# Patient Record
Sex: FEMALE | Race: WHITE | NOT HISPANIC OR LATINO
[De-identification: names, ages, dates, MRNs, and addresses within clinical notes are randomized per-mention and may not be internally consistent; named-entity substitution may affect disease eponyms.]

---

## 2017-04-10 ENCOUNTER — APPOINTMENT (OUTPATIENT)
Dept: UROLOGY | Facility: CLINIC | Age: 67
End: 2017-04-10

## 2017-05-22 ENCOUNTER — APPOINTMENT (OUTPATIENT)
Dept: UROLOGY | Facility: CLINIC | Age: 67
End: 2017-05-22

## 2017-05-22 ENCOUNTER — OUTPATIENT (OUTPATIENT)
Dept: OUTPATIENT SERVICES | Facility: HOSPITAL | Age: 67
LOS: 1 days | End: 2017-05-22
Payer: MEDICARE

## 2017-05-22 VITALS
BODY MASS INDEX: 30.53 KG/M2 | DIASTOLIC BLOOD PRESSURE: 83 MMHG | HEIGHT: 66 IN | HEART RATE: 72 BPM | WEIGHT: 190 LBS | SYSTOLIC BLOOD PRESSURE: 162 MMHG | TEMPERATURE: 98.3 F | RESPIRATION RATE: 17 BRPM

## 2017-05-22 DIAGNOSIS — N20.0 CALCULUS OF KIDNEY: ICD-10-CM

## 2017-05-22 DIAGNOSIS — Z90.5 ACQUIRED ABSENCE OF KIDNEY: Chronic | ICD-10-CM

## 2017-05-22 DIAGNOSIS — N13.30 UNSPECIFIED HYDRONEPHROSIS: ICD-10-CM

## 2017-05-22 DIAGNOSIS — C76.51 MALIGNANT NEOPLASM OF RIGHT LOWER LIMB: Chronic | ICD-10-CM

## 2017-05-22 DIAGNOSIS — Z98.89 OTHER SPECIFIED POSTPROCEDURAL STATES: Chronic | ICD-10-CM

## 2017-05-22 DIAGNOSIS — Z90.49 ACQUIRED ABSENCE OF OTHER SPECIFIED PARTS OF DIGESTIVE TRACT: Chronic | ICD-10-CM

## 2017-05-22 DIAGNOSIS — R82.99 OTHER ABNORMAL FINDINGS IN URINE: ICD-10-CM

## 2017-05-22 DIAGNOSIS — R35.0 FREQUENCY OF MICTURITION: ICD-10-CM

## 2017-05-22 PROCEDURE — 76775 US EXAM ABDO BACK WALL LIM: CPT

## 2017-07-03 ENCOUNTER — APPOINTMENT (OUTPATIENT)
Dept: UROLOGY | Facility: CLINIC | Age: 67
End: 2017-07-03

## 2018-01-21 ENCOUNTER — TRANSCRIPTION ENCOUNTER (OUTPATIENT)
Age: 68
End: 2018-01-21

## 2018-04-10 ENCOUNTER — RESULT REVIEW (OUTPATIENT)
Age: 68
End: 2018-04-10

## 2018-04-10 ENCOUNTER — OUTPATIENT (OUTPATIENT)
Dept: OUTPATIENT SERVICES | Facility: HOSPITAL | Age: 68
LOS: 1 days | End: 2018-04-10
Payer: MEDICARE

## 2018-04-10 DIAGNOSIS — Z90.5 ACQUIRED ABSENCE OF KIDNEY: Chronic | ICD-10-CM

## 2018-04-10 DIAGNOSIS — C78.7 SECONDARY MALIGNANT NEOPLASM OF LIVER AND INTRAHEPATIC BILE DUCT: ICD-10-CM

## 2018-04-10 DIAGNOSIS — Z98.89 OTHER SPECIFIED POSTPROCEDURAL STATES: Chronic | ICD-10-CM

## 2018-04-10 DIAGNOSIS — C76.51 MALIGNANT NEOPLASM OF RIGHT LOWER LIMB: Chronic | ICD-10-CM

## 2018-04-10 DIAGNOSIS — Z90.49 ACQUIRED ABSENCE OF OTHER SPECIFIED PARTS OF DIGESTIVE TRACT: Chronic | ICD-10-CM

## 2018-04-10 PROCEDURE — 47000 NEEDLE BIOPSY OF LIVER PERQ: CPT

## 2018-04-10 PROCEDURE — 88307 TISSUE EXAM BY PATHOLOGIST: CPT

## 2018-04-10 PROCEDURE — 77012 CT SCAN FOR NEEDLE BIOPSY: CPT

## 2018-04-10 PROCEDURE — 77012 CT SCAN FOR NEEDLE BIOPSY: CPT | Mod: 26

## 2018-04-10 PROCEDURE — 88307 TISSUE EXAM BY PATHOLOGIST: CPT | Mod: 26

## 2018-04-11 LAB — SURGICAL PATHOLOGY FINAL REPORT - CH: SIGNIFICANT CHANGE UP

## 2018-05-21 ENCOUNTER — APPOINTMENT (OUTPATIENT)
Dept: UROLOGY | Facility: CLINIC | Age: 68
End: 2018-05-21

## 2018-06-15 ENCOUNTER — OUTPATIENT (OUTPATIENT)
Dept: OUTPATIENT SERVICES | Facility: HOSPITAL | Age: 68
LOS: 1 days | End: 2018-06-15
Payer: MEDICARE

## 2018-06-15 DIAGNOSIS — Z90.49 ACQUIRED ABSENCE OF OTHER SPECIFIED PARTS OF DIGESTIVE TRACT: Chronic | ICD-10-CM

## 2018-06-15 DIAGNOSIS — Z98.89 OTHER SPECIFIED POSTPROCEDURAL STATES: Chronic | ICD-10-CM

## 2018-06-15 DIAGNOSIS — C76.51 MALIGNANT NEOPLASM OF RIGHT LOWER LIMB: Chronic | ICD-10-CM

## 2018-06-15 DIAGNOSIS — Z90.5 ACQUIRED ABSENCE OF KIDNEY: Chronic | ICD-10-CM

## 2018-06-15 DIAGNOSIS — C49.8 MALIGNANT NEOPLASM OF OVERLAPPING SITES OF CONNECTIVE AND SOFT TISSUE: ICD-10-CM

## 2018-06-15 PROCEDURE — 96375 TX/PRO/DX INJ NEW DRUG ADDON: CPT

## 2018-06-15 PROCEDURE — 96415 CHEMO IV INFUSION ADDL HR: CPT

## 2018-06-15 PROCEDURE — 96413 CHEMO IV INFUSION 1 HR: CPT

## 2018-06-15 RX ORDER — DIPHENHYDRAMINE HCL 50 MG
50 CAPSULE ORAL ONCE
Qty: 0 | Refills: 0 | Status: COMPLETED | OUTPATIENT
Start: 2018-06-15 | End: 2018-06-15

## 2018-06-15 RX ORDER — ACETAMINOPHEN 500 MG
650 TABLET ORAL ONCE
Qty: 0 | Refills: 0 | Status: COMPLETED | OUTPATIENT
Start: 2018-06-15 | End: 2018-06-15

## 2018-06-15 RX ORDER — BEVACIZUMAB 400 MG/16ML
480 INJECTION, SOLUTION INTRAVENOUS ONCE
Qty: 0 | Refills: 0 | Status: DISCONTINUED | OUTPATIENT
Start: 2018-06-15 | End: 2018-06-30

## 2018-06-15 RX ADMIN — Medication 650 MILLIGRAM(S): at 11:21

## 2018-06-15 RX ADMIN — Medication 50 MILLIGRAM(S): at 11:21

## 2018-06-28 ENCOUNTER — OUTPATIENT (OUTPATIENT)
Dept: OUTPATIENT SERVICES | Facility: HOSPITAL | Age: 68
LOS: 1 days | End: 2018-06-28
Payer: MEDICARE

## 2018-06-28 DIAGNOSIS — Z90.5 ACQUIRED ABSENCE OF KIDNEY: Chronic | ICD-10-CM

## 2018-06-28 DIAGNOSIS — C49.8 MALIGNANT NEOPLASM OF OVERLAPPING SITES OF CONNECTIVE AND SOFT TISSUE: ICD-10-CM

## 2018-06-28 DIAGNOSIS — C76.51 MALIGNANT NEOPLASM OF RIGHT LOWER LIMB: Chronic | ICD-10-CM

## 2018-06-28 DIAGNOSIS — Z90.49 ACQUIRED ABSENCE OF OTHER SPECIFIED PARTS OF DIGESTIVE TRACT: Chronic | ICD-10-CM

## 2018-06-28 DIAGNOSIS — Z98.89 OTHER SPECIFIED POSTPROCEDURAL STATES: Chronic | ICD-10-CM

## 2018-06-28 PROCEDURE — 96413 CHEMO IV INFUSION 1 HR: CPT

## 2018-06-28 PROCEDURE — 96375 TX/PRO/DX INJ NEW DRUG ADDON: CPT

## 2018-06-28 RX ORDER — BEVACIZUMAB 400 MG/16ML
480 INJECTION, SOLUTION INTRAVENOUS ONCE
Qty: 0 | Refills: 0 | Status: DISCONTINUED | OUTPATIENT
Start: 2018-06-28 | End: 2018-07-13

## 2018-06-28 RX ORDER — DIPHENHYDRAMINE HCL 50 MG
50 CAPSULE ORAL ONCE
Qty: 0 | Refills: 0 | Status: COMPLETED | OUTPATIENT
Start: 2018-06-28 | End: 2018-06-28

## 2018-06-28 RX ORDER — ACETAMINOPHEN 500 MG
650 TABLET ORAL ONCE
Qty: 0 | Refills: 0 | Status: COMPLETED | OUTPATIENT
Start: 2018-06-28 | End: 2018-06-28

## 2018-06-28 RX ADMIN — Medication 650 MILLIGRAM(S): at 09:25

## 2018-06-28 RX ADMIN — Medication 50 MILLIGRAM(S): at 09:25

## 2018-07-12 ENCOUNTER — OUTPATIENT (OUTPATIENT)
Dept: OUTPATIENT SERVICES | Facility: HOSPITAL | Age: 68
LOS: 1 days | End: 2018-07-12
Payer: MEDICARE

## 2018-07-12 DIAGNOSIS — Z90.49 ACQUIRED ABSENCE OF OTHER SPECIFIED PARTS OF DIGESTIVE TRACT: Chronic | ICD-10-CM

## 2018-07-12 DIAGNOSIS — C49.8 MALIGNANT NEOPLASM OF OVERLAPPING SITES OF CONNECTIVE AND SOFT TISSUE: ICD-10-CM

## 2018-07-12 DIAGNOSIS — C76.51 MALIGNANT NEOPLASM OF RIGHT LOWER LIMB: Chronic | ICD-10-CM

## 2018-07-12 DIAGNOSIS — Z90.5 ACQUIRED ABSENCE OF KIDNEY: Chronic | ICD-10-CM

## 2018-07-12 DIAGNOSIS — Z98.89 OTHER SPECIFIED POSTPROCEDURAL STATES: Chronic | ICD-10-CM

## 2018-07-12 PROCEDURE — 96375 TX/PRO/DX INJ NEW DRUG ADDON: CPT

## 2018-07-12 PROCEDURE — 96413 CHEMO IV INFUSION 1 HR: CPT

## 2018-07-12 RX ORDER — BEVACIZUMAB 400 MG/16ML
480 INJECTION, SOLUTION INTRAVENOUS ONCE
Qty: 0 | Refills: 0 | Status: DISCONTINUED | OUTPATIENT
Start: 2018-07-12 | End: 2018-07-27

## 2018-07-12 RX ORDER — DIPHENHYDRAMINE HCL 50 MG
50 CAPSULE ORAL ONCE
Qty: 0 | Refills: 0 | Status: COMPLETED | OUTPATIENT
Start: 2018-07-12 | End: 2018-07-12

## 2018-07-12 RX ORDER — ACETAMINOPHEN 500 MG
650 TABLET ORAL ONCE
Qty: 0 | Refills: 0 | Status: COMPLETED | OUTPATIENT
Start: 2018-07-12 | End: 2018-07-12

## 2018-07-12 RX ADMIN — Medication 50 MILLIGRAM(S): at 08:18

## 2018-07-12 RX ADMIN — Medication 650 MILLIGRAM(S): at 08:17

## 2018-07-26 ENCOUNTER — OUTPATIENT (OUTPATIENT)
Dept: OUTPATIENT SERVICES | Facility: HOSPITAL | Age: 68
LOS: 1 days | End: 2018-07-26
Payer: MEDICARE

## 2018-07-26 DIAGNOSIS — C76.51 MALIGNANT NEOPLASM OF RIGHT LOWER LIMB: Chronic | ICD-10-CM

## 2018-07-26 DIAGNOSIS — Z90.5 ACQUIRED ABSENCE OF KIDNEY: Chronic | ICD-10-CM

## 2018-07-26 DIAGNOSIS — C49.8 MALIGNANT NEOPLASM OF OVERLAPPING SITES OF CONNECTIVE AND SOFT TISSUE: ICD-10-CM

## 2018-07-26 DIAGNOSIS — Z90.49 ACQUIRED ABSENCE OF OTHER SPECIFIED PARTS OF DIGESTIVE TRACT: Chronic | ICD-10-CM

## 2018-07-26 DIAGNOSIS — Z98.89 OTHER SPECIFIED POSTPROCEDURAL STATES: Chronic | ICD-10-CM

## 2018-07-26 PROCEDURE — 96375 TX/PRO/DX INJ NEW DRUG ADDON: CPT

## 2018-07-26 PROCEDURE — 96413 CHEMO IV INFUSION 1 HR: CPT

## 2018-07-26 RX ORDER — DIPHENHYDRAMINE HCL 50 MG
50 CAPSULE ORAL ONCE
Qty: 0 | Refills: 0 | Status: COMPLETED | OUTPATIENT
Start: 2018-07-26 | End: 2018-07-26

## 2018-07-26 RX ORDER — BEVACIZUMAB 400 MG/16ML
480 INJECTION, SOLUTION INTRAVENOUS ONCE
Qty: 0 | Refills: 0 | Status: DISCONTINUED | OUTPATIENT
Start: 2018-07-26 | End: 2018-08-10

## 2018-07-26 RX ORDER — ACETAMINOPHEN 500 MG
650 TABLET ORAL ONCE
Qty: 0 | Refills: 0 | Status: COMPLETED | OUTPATIENT
Start: 2018-07-26 | End: 2018-07-26

## 2018-07-26 RX ADMIN — Medication 50 MILLIGRAM(S): at 11:48

## 2018-07-26 RX ADMIN — Medication 650 MILLIGRAM(S): at 11:49

## 2018-08-08 ENCOUNTER — APPOINTMENT (OUTPATIENT)
Dept: UROLOGY | Facility: CLINIC | Age: 68
End: 2018-08-08
Payer: MEDICARE

## 2018-08-08 VITALS
RESPIRATION RATE: 17 BRPM | TEMPERATURE: 98 F | SYSTOLIC BLOOD PRESSURE: 136 MMHG | HEART RATE: 65 BPM | WEIGHT: 202 LBS | DIASTOLIC BLOOD PRESSURE: 82 MMHG | HEIGHT: 66 IN | BODY MASS INDEX: 32.47 KG/M2

## 2018-08-08 DIAGNOSIS — R82.99 OTHER ABNORMAL FINDINGS IN URINE: ICD-10-CM

## 2018-08-08 PROCEDURE — 99214 OFFICE O/P EST MOD 30 MIN: CPT

## 2018-08-09 ENCOUNTER — OUTPATIENT (OUTPATIENT)
Dept: OUTPATIENT SERVICES | Facility: HOSPITAL | Age: 68
LOS: 1 days | End: 2018-08-09
Payer: MEDICARE

## 2018-08-09 DIAGNOSIS — Z90.5 ACQUIRED ABSENCE OF KIDNEY: Chronic | ICD-10-CM

## 2018-08-09 DIAGNOSIS — Z98.89 OTHER SPECIFIED POSTPROCEDURAL STATES: Chronic | ICD-10-CM

## 2018-08-09 DIAGNOSIS — Z90.49 ACQUIRED ABSENCE OF OTHER SPECIFIED PARTS OF DIGESTIVE TRACT: Chronic | ICD-10-CM

## 2018-08-09 DIAGNOSIS — C49.8 MALIGNANT NEOPLASM OF OVERLAPPING SITES OF CONNECTIVE AND SOFT TISSUE: ICD-10-CM

## 2018-08-09 DIAGNOSIS — C76.51 MALIGNANT NEOPLASM OF RIGHT LOWER LIMB: Chronic | ICD-10-CM

## 2018-08-09 PROCEDURE — 96375 TX/PRO/DX INJ NEW DRUG ADDON: CPT

## 2018-08-09 PROCEDURE — 96413 CHEMO IV INFUSION 1 HR: CPT

## 2018-08-09 RX ORDER — BEVACIZUMAB 400 MG/16ML
480 INJECTION, SOLUTION INTRAVENOUS ONCE
Qty: 0 | Refills: 0 | Status: DISCONTINUED | OUTPATIENT
Start: 2018-08-09 | End: 2018-08-24

## 2018-08-09 RX ORDER — DIPHENHYDRAMINE HCL 50 MG
50 CAPSULE ORAL ONCE
Qty: 0 | Refills: 0 | Status: COMPLETED | OUTPATIENT
Start: 2018-08-09 | End: 2018-08-09

## 2018-08-09 RX ORDER — ACETAMINOPHEN 500 MG
650 TABLET ORAL ONCE
Qty: 0 | Refills: 0 | Status: COMPLETED | OUTPATIENT
Start: 2018-08-09 | End: 2018-08-09

## 2018-08-09 RX ADMIN — Medication 650 MILLIGRAM(S): at 08:57

## 2018-08-09 RX ADMIN — Medication 50 MILLIGRAM(S): at 08:57

## 2018-08-23 ENCOUNTER — OUTPATIENT (OUTPATIENT)
Dept: OUTPATIENT SERVICES | Facility: HOSPITAL | Age: 68
LOS: 1 days | End: 2018-08-23
Payer: MEDICARE

## 2018-08-23 DIAGNOSIS — Z98.89 OTHER SPECIFIED POSTPROCEDURAL STATES: Chronic | ICD-10-CM

## 2018-08-23 DIAGNOSIS — D64.9 ANEMIA, UNSPECIFIED: ICD-10-CM

## 2018-08-23 DIAGNOSIS — C76.51 MALIGNANT NEOPLASM OF RIGHT LOWER LIMB: Chronic | ICD-10-CM

## 2018-08-23 DIAGNOSIS — Z90.5 ACQUIRED ABSENCE OF KIDNEY: Chronic | ICD-10-CM

## 2018-08-23 DIAGNOSIS — Z90.49 ACQUIRED ABSENCE OF OTHER SPECIFIED PARTS OF DIGESTIVE TRACT: Chronic | ICD-10-CM

## 2018-08-23 PROCEDURE — 96375 TX/PRO/DX INJ NEW DRUG ADDON: CPT

## 2018-08-23 PROCEDURE — 96413 CHEMO IV INFUSION 1 HR: CPT

## 2018-08-23 RX ORDER — BEVACIZUMAB 400 MG/16ML
480 INJECTION, SOLUTION INTRAVENOUS ONCE
Qty: 0 | Refills: 0 | Status: DISCONTINUED | OUTPATIENT
Start: 2018-08-23 | End: 2018-09-07

## 2018-08-23 RX ORDER — DIPHENHYDRAMINE HCL 50 MG
50 CAPSULE ORAL ONCE
Qty: 0 | Refills: 0 | Status: COMPLETED | OUTPATIENT
Start: 2018-08-23 | End: 2018-08-23

## 2018-08-23 RX ORDER — ACETAMINOPHEN 500 MG
650 TABLET ORAL ONCE
Qty: 0 | Refills: 0 | Status: COMPLETED | OUTPATIENT
Start: 2018-08-23 | End: 2018-08-23

## 2018-08-23 RX ADMIN — Medication 650 MILLIGRAM(S): at 09:49

## 2018-08-23 RX ADMIN — Medication 50 MILLIGRAM(S): at 09:49

## 2019-02-13 ENCOUNTER — APPOINTMENT (OUTPATIENT)
Dept: UROLOGY | Facility: CLINIC | Age: 69
End: 2019-02-13
Payer: MEDICARE

## 2019-02-13 DIAGNOSIS — N20.0 CALCULUS OF KIDNEY: ICD-10-CM

## 2019-02-13 PROCEDURE — 99213 OFFICE O/P EST LOW 20 MIN: CPT

## 2019-02-13 NOTE — PHYSICAL EXAM
[General Appearance - Well Developed] : well developed [General Appearance - Well Nourished] : well nourished [Normal Appearance] : normal appearance [Well Groomed] : well groomed [General Appearance - In No Acute Distress] : no acute distress [Abdomen Soft] : soft [Abdomen Tenderness] : non-tender [Abdomen Mass (___ Cm)] : no abdominal mass palpated [Abdomen Hernia] : no hernia was discovered [Costovertebral Angle Tenderness] : no ~M costovertebral angle tenderness [Skin Color & Pigmentation] : normal skin color and pigmentation [Edema] : no peripheral edema [] : no respiratory distress [Respiration, Rhythm And Depth] : normal respiratory rhythm and effort [Exaggerated Use Of Accessory Muscles For Inspiration] : no accessory muscle use [Oriented To Time, Place, And Person] : oriented to person, place, and time [Affect] : the affect was normal [Mood] : the mood was normal [Not Anxious] : not anxious [Normal Station and Gait] : the gait and station were normal for the patient's age

## 2019-02-14 NOTE — HISTORY OF PRESENT ILLNESS
[None] : None [FreeTextEntry1] : 67y/o woman\par Hx of LEFT PCNL and URS Jan 2016. 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 \par She failed Clinical Trial treatment at [facility]. She is currently on Sutent.\par She feels well otherwise except for back pain which she attributes to the Sutent. she is now taking it every other day and experiences relief when she is not taking it. \par \par 24 hr urine: not ordered\par CT scan (Jan 2019 [facility]): no renal stones and no hydronephrosis.\par

## 2019-02-14 NOTE — ASSESSMENT
[FreeTextEntry1] : Continue increase fluids intake, lower salt intake, lower animal protein intake, increase citrus fruits and juices.\par f/u 6 months.\par We will review any imaging she has at that time which should be sufficient as long as abdomen is imaged.

## 2019-05-15 ENCOUNTER — INPATIENT (INPATIENT)
Facility: HOSPITAL | Age: 69
LOS: 1 days | Discharge: HOME | DRG: 683 | End: 2019-05-17
Attending: FAMILY MEDICINE | Admitting: FAMILY MEDICINE
Payer: MEDICARE

## 2019-05-15 VITALS
SYSTOLIC BLOOD PRESSURE: 121 MMHG | OXYGEN SATURATION: 100 % | TEMPERATURE: 99 F | HEART RATE: 107 BPM | RESPIRATION RATE: 18 BRPM | WEIGHT: 169.98 LBS | DIASTOLIC BLOOD PRESSURE: 72 MMHG

## 2019-05-15 DIAGNOSIS — Z90.49 ACQUIRED ABSENCE OF OTHER SPECIFIED PARTS OF DIGESTIVE TRACT: Chronic | ICD-10-CM

## 2019-05-15 DIAGNOSIS — Z90.5 ACQUIRED ABSENCE OF KIDNEY: Chronic | ICD-10-CM

## 2019-05-15 DIAGNOSIS — Z98.89 OTHER SPECIFIED POSTPROCEDURAL STATES: Chronic | ICD-10-CM

## 2019-05-15 DIAGNOSIS — C76.51 MALIGNANT NEOPLASM OF RIGHT LOWER LIMB: Chronic | ICD-10-CM

## 2019-05-15 DIAGNOSIS — N17.9 ACUTE KIDNEY FAILURE, UNSPECIFIED: ICD-10-CM

## 2019-05-15 LAB
ALBUMIN SERPL ELPH-MCNC: 3.3 G/DL — SIGNIFICANT CHANGE UP (ref 3.3–5)
ALP SERPL-CCNC: 207 U/L — HIGH (ref 40–120)
ALT FLD-CCNC: 48 U/L — SIGNIFICANT CHANGE UP (ref 12–78)
ANION GAP SERPL CALC-SCNC: 12 MMOL/L — SIGNIFICANT CHANGE UP (ref 5–17)
APPEARANCE UR: CLEAR — SIGNIFICANT CHANGE UP
AST SERPL-CCNC: 59 U/L — HIGH (ref 15–37)
BASOPHILS # BLD AUTO: 0.01 K/UL — SIGNIFICANT CHANGE UP (ref 0–0.2)
BASOPHILS NFR BLD AUTO: 0.3 % — SIGNIFICANT CHANGE UP (ref 0–2)
BILIRUB SERPL-MCNC: 0.8 MG/DL — SIGNIFICANT CHANGE UP (ref 0.2–1.2)
BILIRUB UR-MCNC: NEGATIVE — SIGNIFICANT CHANGE UP
BUN SERPL-MCNC: 39 MG/DL — HIGH (ref 7–23)
CALCIUM SERPL-MCNC: 8.8 MG/DL — SIGNIFICANT CHANGE UP (ref 8.5–10.1)
CHLORIDE SERPL-SCNC: 106 MMOL/L — SIGNIFICANT CHANGE UP (ref 96–108)
CO2 SERPL-SCNC: 23 MMOL/L — SIGNIFICANT CHANGE UP (ref 22–31)
COLOR SPEC: YELLOW — SIGNIFICANT CHANGE UP
CREAT SERPL-MCNC: 2.3 MG/DL — HIGH (ref 0.5–1.3)
DIFF PNL FLD: ABNORMAL
EOSINOPHIL # BLD AUTO: 0.02 K/UL — SIGNIFICANT CHANGE UP (ref 0–0.5)
EOSINOPHIL NFR BLD AUTO: 0.6 % — SIGNIFICANT CHANGE UP (ref 0–6)
GLUCOSE SERPL-MCNC: 99 MG/DL — SIGNIFICANT CHANGE UP (ref 70–99)
GLUCOSE UR QL: NEGATIVE — SIGNIFICANT CHANGE UP
HCT VFR BLD CALC: 25.6 % — LOW (ref 34.5–45)
HGB BLD-MCNC: 8.5 G/DL — LOW (ref 11.5–15.5)
IMM GRANULOCYTES NFR BLD AUTO: 0.3 % — SIGNIFICANT CHANGE UP (ref 0–1.5)
KETONES UR-MCNC: ABNORMAL
LEUKOCYTE ESTERASE UR-ACNC: ABNORMAL
LIDOCAIN IGE QN: 93 U/L — SIGNIFICANT CHANGE UP (ref 73–393)
LYMPHOCYTES # BLD AUTO: 0.59 K/UL — LOW (ref 1–3.3)
LYMPHOCYTES # BLD AUTO: 17.8 % — SIGNIFICANT CHANGE UP (ref 13–44)
MCHC RBC-ENTMCNC: 33.2 GM/DL — SIGNIFICANT CHANGE UP (ref 32–36)
MCHC RBC-ENTMCNC: 38.1 PG — HIGH (ref 27–34)
MCV RBC AUTO: 114.8 FL — HIGH (ref 80–100)
MONOCYTES # BLD AUTO: 0.48 K/UL — SIGNIFICANT CHANGE UP (ref 0–0.9)
MONOCYTES NFR BLD AUTO: 14.5 % — HIGH (ref 2–14)
NEUTROPHILS # BLD AUTO: 2.2 K/UL — SIGNIFICANT CHANGE UP (ref 1.8–7.4)
NEUTROPHILS NFR BLD AUTO: 66.5 % — SIGNIFICANT CHANGE UP (ref 43–77)
NITRITE UR-MCNC: NEGATIVE — SIGNIFICANT CHANGE UP
NRBC # BLD: 0 /100 WBCS — SIGNIFICANT CHANGE UP (ref 0–0)
NT-PROBNP SERPL-SCNC: 1462 PG/ML — HIGH (ref 0–125)
PH UR: 5 — SIGNIFICANT CHANGE UP (ref 5–8)
PLATELET # BLD AUTO: 188 K/UL — SIGNIFICANT CHANGE UP (ref 150–400)
POTASSIUM SERPL-MCNC: 3.6 MMOL/L — SIGNIFICANT CHANGE UP (ref 3.5–5.3)
POTASSIUM SERPL-SCNC: 3.6 MMOL/L — SIGNIFICANT CHANGE UP (ref 3.5–5.3)
PROT SERPL-MCNC: 6.5 G/DL — SIGNIFICANT CHANGE UP (ref 6–8.3)
PROT UR-MCNC: 75 MG/DL
RBC # BLD: 2.23 M/UL — LOW (ref 3.8–5.2)
RBC # FLD: 14.9 % — HIGH (ref 10.3–14.5)
SODIUM SERPL-SCNC: 141 MMOL/L — SIGNIFICANT CHANGE UP (ref 135–145)
SP GR SPEC: 1.02 — SIGNIFICANT CHANGE UP (ref 1.01–1.02)
TROPONIN I SERPL-MCNC: <.015 NG/ML — SIGNIFICANT CHANGE UP (ref 0.01–0.04)
UROBILINOGEN FLD QL: NEGATIVE — SIGNIFICANT CHANGE UP
WBC # BLD: 3.31 K/UL — LOW (ref 3.8–10.5)
WBC # FLD AUTO: 3.31 K/UL — LOW (ref 3.8–10.5)

## 2019-05-15 PROCEDURE — 93970 EXTREMITY STUDY: CPT | Mod: 26

## 2019-05-15 PROCEDURE — 99285 EMERGENCY DEPT VISIT HI MDM: CPT

## 2019-05-15 PROCEDURE — 71046 X-RAY EXAM CHEST 2 VIEWS: CPT | Mod: 26

## 2019-05-15 PROCEDURE — 74019 RADEX ABDOMEN 2 VIEWS: CPT | Mod: 26

## 2019-05-15 PROCEDURE — 93010 ELECTROCARDIOGRAM REPORT: CPT

## 2019-05-15 RX ORDER — SODIUM CHLORIDE 9 MG/ML
1000 INJECTION INTRAMUSCULAR; INTRAVENOUS; SUBCUTANEOUS ONCE
Refills: 0 | Status: COMPLETED | OUTPATIENT
Start: 2019-05-15 | End: 2019-05-15

## 2019-05-15 RX ORDER — HEPARIN SODIUM 5000 [USP'U]/ML
5000 INJECTION INTRAVENOUS; SUBCUTANEOUS EVERY 12 HOURS
Refills: 0 | Status: DISCONTINUED | OUTPATIENT
Start: 2019-05-15 | End: 2019-05-16

## 2019-05-15 RX ORDER — TEMOZOLOMIDE 140 MG/1
2 CAPSULE ORAL
Qty: 0 | Refills: 0 | DISCHARGE

## 2019-05-15 RX ORDER — ONDANSETRON 8 MG/1
4 TABLET, FILM COATED ORAL ONCE
Refills: 0 | Status: COMPLETED | OUTPATIENT
Start: 2019-05-15 | End: 2019-05-15

## 2019-05-15 RX ORDER — CEFTRIAXONE 500 MG/1
1 INJECTION, POWDER, FOR SOLUTION INTRAMUSCULAR; INTRAVENOUS EVERY 24 HOURS
Refills: 0 | Status: DISCONTINUED | OUTPATIENT
Start: 2019-05-15 | End: 2019-05-17

## 2019-05-15 RX ORDER — ONDANSETRON 8 MG/1
1 TABLET, FILM COATED ORAL
Qty: 0 | Refills: 0 | DISCHARGE

## 2019-05-15 RX ORDER — ATENOLOL 25 MG/1
50 TABLET ORAL DAILY
Refills: 0 | Status: DISCONTINUED | OUTPATIENT
Start: 2019-05-15 | End: 2019-05-16

## 2019-05-15 RX ORDER — SODIUM CHLORIDE 9 MG/ML
1000 INJECTION, SOLUTION INTRAVENOUS
Refills: 0 | Status: DISCONTINUED | OUTPATIENT
Start: 2019-05-15 | End: 2019-05-16

## 2019-05-15 RX ADMIN — SODIUM CHLORIDE 75 MILLILITER(S): 9 INJECTION, SOLUTION INTRAVENOUS at 22:09

## 2019-05-15 RX ADMIN — ONDANSETRON 4 MILLIGRAM(S): 8 TABLET, FILM COATED ORAL at 18:32

## 2019-05-15 RX ADMIN — ATENOLOL 50 MILLIGRAM(S): 25 TABLET ORAL at 21:15

## 2019-05-15 RX ADMIN — CEFTRIAXONE 100 GRAM(S): 500 INJECTION, POWDER, FOR SOLUTION INTRAMUSCULAR; INTRAVENOUS at 21:14

## 2019-05-15 RX ADMIN — SODIUM CHLORIDE 1000 MILLILITER(S): 9 INJECTION INTRAMUSCULAR; INTRAVENOUS; SUBCUTANEOUS at 19:32

## 2019-05-15 RX ADMIN — SODIUM CHLORIDE 1000 MILLILITER(S): 9 INJECTION INTRAMUSCULAR; INTRAVENOUS; SUBCUTANEOUS at 18:32

## 2019-05-15 NOTE — ED ADULT NURSE NOTE - PMH
Cancer of leg, right    Hypertension    Hypertension    Kidney stone    Renal calculi    Sarcoma  liver

## 2019-05-15 NOTE — ED PROVIDER NOTE - ATTENDING CONTRIBUTION TO CARE
67 y/o F sent in by Heme Onc for further evaluation after vomiting and weakness x 2 days.  Lab consistent with TOM. Discussed case with Dr. Goldstein who recommends admission for IV hydration.

## 2019-05-15 NOTE — ED PROVIDER NOTE - CARE PLAN
Principal Discharge DX:	TOM (acute kidney injury)  Secondary Diagnosis:	Vomiting  Secondary Diagnosis:	Dehydration

## 2019-05-15 NOTE — ED PROVIDER NOTE - CLINICAL SUMMARY MEDICAL DECISION MAKING FREE TEXT BOX
69 yo F with hx of sarcoma of liver and lung nodules co n/v x 2 days, intermittent for over a week, was admitted over a week ago at [facility] for L lower leg wound, is on cipro and amoxicillin, sent by oncologist today for IV hydration and further eval, will get labs, axr, IVF, antiemetics, re-assess

## 2019-05-15 NOTE — ED PROVIDER NOTE - PROGRESS NOTE DETAILS
C/D/W ED attending, Dr. Casiano who agreed with disposition and plan. Spoke to oncologist, Dr. Goldstein, advised to admit pt to Dr. Singh due to elevated creatinine and recurrent symptoms. Will see pt in the am and Dr. Oneal, wound care specialist. Spoke to Dr. Singh, discussed case and results, accepted admission.

## 2019-05-15 NOTE — ED ADULT NURSE NOTE - PSH
Cancer of leg, right  excision of tumor and txted with radiaton therapy  H/O partial nephrectomy  left -   History of nephrectomy    S/P appendectomy  cholecystectomy -   S/P arthroscopic surgery of left knee    S/P  Section  1968  S/P cholecystectomy  appendectomy  S/P D&C  2012  S/P dilatation and curettage  for polyps  S/P tubal ligation

## 2019-05-15 NOTE — ED ADULT NURSE NOTE - OBJECTIVE STATEMENT
67 y/o female with pmh of cancer presents to the ed c/o nausea and vomiting with associated abd pain. pt also has wound to LLE chronic that she has treated, last treated today. pt denies fever chills chest pain sob headache and urinary problems.

## 2019-05-15 NOTE — ED PROVIDER NOTE - OBJECTIVE STATEMENT
67 y/o F with hx of sarcoma of liver, lung nodules, HTN referred by oncologist, Dr. Hernández for evaluation. Pt c/o vomiting x 2 days. Pt states that she has had intermittent vomiting for a few weeks since she was admitted over a week ago at [facility] for L lower leg wound, dc home last week on cipro and amoxicillin.  States that she was able to keep down some fluids earlier. Pt states that she had mild erythema to L posterior lower leg, had US of BLE at [facility] with CT of chest on Saturday which was negative. Pt states that she has associated generalized weakness. Pt denies fever, diarrhea, abdominal pain, CP, SOB.   PMD: Dr. Frank Amico  Wound care: Dr. Oneal

## 2019-05-16 DIAGNOSIS — C76.51 MALIGNANT NEOPLASM OF RIGHT LOWER LIMB: ICD-10-CM

## 2019-05-16 DIAGNOSIS — N17.9 ACUTE KIDNEY FAILURE, UNSPECIFIED: ICD-10-CM

## 2019-05-16 DIAGNOSIS — L03.90 CELLULITIS, UNSPECIFIED: ICD-10-CM

## 2019-05-16 DIAGNOSIS — C49.9 MALIGNANT NEOPLASM OF CONNECTIVE AND SOFT TISSUE, UNSPECIFIED: ICD-10-CM

## 2019-05-16 DIAGNOSIS — N20.0 CALCULUS OF KIDNEY: ICD-10-CM

## 2019-05-16 DIAGNOSIS — R11.14 BILIOUS VOMITING: ICD-10-CM

## 2019-05-16 DIAGNOSIS — D48.1 NEOPLASM OF UNCERTAIN BEHAVIOR OF CONNECTIVE AND OTHER SOFT TISSUE: ICD-10-CM

## 2019-05-16 DIAGNOSIS — I10 ESSENTIAL (PRIMARY) HYPERTENSION: ICD-10-CM

## 2019-05-16 DIAGNOSIS — R11.10 VOMITING, UNSPECIFIED: ICD-10-CM

## 2019-05-16 DIAGNOSIS — L97.329 NON-PRESSURE CHRONIC ULCER OF LEFT ANKLE WITH UNSPECIFIED SEVERITY: ICD-10-CM

## 2019-05-16 DIAGNOSIS — E86.0 DEHYDRATION: ICD-10-CM

## 2019-05-16 DIAGNOSIS — D63.8 ANEMIA IN OTHER CHRONIC DISEASES CLASSIFIED ELSEWHERE: ICD-10-CM

## 2019-05-16 LAB
ANION GAP SERPL CALC-SCNC: 12 MMOL/L — SIGNIFICANT CHANGE UP (ref 5–17)
BUN SERPL-MCNC: 32 MG/DL — HIGH (ref 7–23)
CALCIUM SERPL-MCNC: 8.1 MG/DL — LOW (ref 8.5–10.1)
CALCIUM SERPL-MCNC: 8.8 MG/DL — SIGNIFICANT CHANGE UP (ref 8.4–10.5)
CHLORIDE SERPL-SCNC: 108 MMOL/L — SIGNIFICANT CHANGE UP (ref 96–108)
CK SERPL-CCNC: 49 U/L — SIGNIFICANT CHANGE UP (ref 26–192)
CO2 SERPL-SCNC: 22 MMOL/L — SIGNIFICANT CHANGE UP (ref 22–31)
CREAT ?TM UR-MCNC: 160 MG/DL — SIGNIFICANT CHANGE UP
CREAT SERPL-MCNC: 1.9 MG/DL — HIGH (ref 0.5–1.3)
EOSINOPHIL NFR URNS MANUAL: NEGATIVE — SIGNIFICANT CHANGE UP
GLUCOSE SERPL-MCNC: 79 MG/DL — SIGNIFICANT CHANGE UP (ref 70–99)
HCT VFR BLD CALC: 24.7 % — LOW (ref 34.5–45)
HGB BLD-MCNC: 8.1 G/DL — LOW (ref 11.5–15.5)
MCHC RBC-ENTMCNC: 32.8 GM/DL — SIGNIFICANT CHANGE UP (ref 32–36)
MCHC RBC-ENTMCNC: 37.7 PG — HIGH (ref 27–34)
MCV RBC AUTO: 114.9 FL — HIGH (ref 80–100)
NRBC # BLD: 0 /100 WBCS — SIGNIFICANT CHANGE UP (ref 0–0)
PHOSPHATE SERPL-MCNC: 3 MG/DL — SIGNIFICANT CHANGE UP (ref 2.5–4.5)
PLATELET # BLD AUTO: 211 K/UL — SIGNIFICANT CHANGE UP (ref 150–400)
POTASSIUM SERPL-MCNC: 3.3 MMOL/L — LOW (ref 3.5–5.3)
POTASSIUM SERPL-SCNC: 3.3 MMOL/L — LOW (ref 3.5–5.3)
PROT ?TM UR-MCNC: 92 MG/DL — HIGH (ref 0–12)
PROT/CREAT UR-RTO: 0.6 RATIO — HIGH (ref 0–0.2)
PTH-INTACT FLD-MCNC: 57 PG/ML — SIGNIFICANT CHANGE UP (ref 15–65)
RBC # BLD: 2.15 M/UL — LOW (ref 3.8–5.2)
RBC # FLD: 14.9 % — HIGH (ref 10.3–14.5)
SODIUM SERPL-SCNC: 142 MMOL/L — SIGNIFICANT CHANGE UP (ref 135–145)
SODIUM UR-SCNC: 24 MMOL/L — SIGNIFICANT CHANGE UP
URATE SERPL-MCNC: 8.8 MG/DL — HIGH (ref 2.5–7)
UUN UR-MCNC: 809 MG/DL — SIGNIFICANT CHANGE UP
WBC # BLD: 3.51 K/UL — LOW (ref 3.8–10.5)
WBC # FLD AUTO: 3.51 K/UL — LOW (ref 3.8–10.5)

## 2019-05-16 PROCEDURE — 74176 CT ABD & PELVIS W/O CONTRAST: CPT | Mod: 26

## 2019-05-16 PROCEDURE — 76770 US EXAM ABDO BACK WALL COMP: CPT | Mod: 26

## 2019-05-16 RX ORDER — FAMOTIDINE 10 MG/ML
20 INJECTION INTRAVENOUS DAILY
Refills: 0 | Status: DISCONTINUED | OUTPATIENT
Start: 2019-05-16 | End: 2019-05-17

## 2019-05-16 RX ORDER — DRONABINOL 2.5 MG
2.5 CAPSULE ORAL
Refills: 0 | Status: DISCONTINUED | OUTPATIENT
Start: 2019-05-16 | End: 2019-05-17

## 2019-05-16 RX ORDER — LACTOBACILLUS ACIDOPHILUS 100MM CELL
1 CAPSULE ORAL
Refills: 0 | Status: DISCONTINUED | OUTPATIENT
Start: 2019-05-16 | End: 2019-05-17

## 2019-05-16 RX ORDER — ATENOLOL 25 MG/1
25 TABLET ORAL DAILY
Refills: 0 | Status: DISCONTINUED | OUTPATIENT
Start: 2019-05-16 | End: 2019-05-17

## 2019-05-16 RX ORDER — DEXTROSE MONOHYDRATE, SODIUM CHLORIDE, AND POTASSIUM CHLORIDE 50; .745; 4.5 G/1000ML; G/1000ML; G/1000ML
1000 INJECTION, SOLUTION INTRAVENOUS
Refills: 0 | Status: DISCONTINUED | OUTPATIENT
Start: 2019-05-16 | End: 2019-05-17

## 2019-05-16 RX ORDER — POTASSIUM CHLORIDE 20 MEQ
10 PACKET (EA) ORAL THREE TIMES A DAY
Refills: 0 | Status: DISCONTINUED | OUTPATIENT
Start: 2019-05-16 | End: 2019-05-17

## 2019-05-16 RX ORDER — HEPARIN SODIUM 5000 [USP'U]/ML
5000 INJECTION INTRAVENOUS; SUBCUTANEOUS EVERY 8 HOURS
Refills: 0 | Status: DISCONTINUED | OUTPATIENT
Start: 2019-05-16 | End: 2019-05-17

## 2019-05-16 RX ADMIN — FAMOTIDINE 20 MILLIGRAM(S): 10 INJECTION INTRAVENOUS at 12:12

## 2019-05-16 RX ADMIN — Medication 1 TABLET(S): at 17:15

## 2019-05-16 RX ADMIN — CEFTRIAXONE 100 GRAM(S): 500 INJECTION, POWDER, FOR SOLUTION INTRAMUSCULAR; INTRAVENOUS at 21:58

## 2019-05-16 RX ADMIN — Medication 1 TABLET(S): at 12:12

## 2019-05-16 RX ADMIN — SODIUM CHLORIDE 75 MILLILITER(S): 9 INJECTION, SOLUTION INTRAVENOUS at 00:00

## 2019-05-16 RX ADMIN — Medication 10 MILLIEQUIVALENT(S): at 21:58

## 2019-05-16 RX ADMIN — DEXTROSE MONOHYDRATE, SODIUM CHLORIDE, AND POTASSIUM CHLORIDE 50 MILLILITER(S): 50; .745; 4.5 INJECTION, SOLUTION INTRAVENOUS at 17:15

## 2019-05-16 RX ADMIN — HEPARIN SODIUM 5000 UNIT(S): 5000 INJECTION INTRAVENOUS; SUBCUTANEOUS at 13:52

## 2019-05-16 RX ADMIN — Medication 2.5 MILLIGRAM(S): at 17:15

## 2019-05-16 RX ADMIN — HEPARIN SODIUM 5000 UNIT(S): 5000 INJECTION INTRAVENOUS; SUBCUTANEOUS at 05:52

## 2019-05-16 RX ADMIN — HEPARIN SODIUM 5000 UNIT(S): 5000 INJECTION INTRAVENOUS; SUBCUTANEOUS at 21:58

## 2019-05-16 NOTE — DIETITIAN INITIAL EVALUATION ADULT. - PHYSICAL APPEARANCE
BMI 27.4 kg/m2 using stated height of 66 inches and stated weight of 170 pounds; Nutrition focused physical exam conducted - found signs of malnutrition [ ]absent [X]present   Subcutaneous fat loss: [X] Orbital fat pads region (moderate), [X]Buccal fat region (mild), [X]Triceps region (mild),  Muscle wasting: [X]Temples region (moderate), [x]Clavicle region (moderate), [X]Shoulder region (mild), [x]Scapula region (mild), [X]Interosseous region (mild)/overweight

## 2019-05-16 NOTE — CONSULT NOTE ADULT - ASSESSMENT
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    Recommendations  1.  hold sutent  2.  abx as per ID  3.  follow CBC  4.  further heme recommendations pending above

## 2019-05-16 NOTE — PHYSICAL THERAPY INITIAL EVALUATION ADULT - ADDITIONAL COMMENTS
Pt lives in private home + 3 RENZO with railing.  Patient reports additional 3 steps and landing to enter once in home.  Patient was independent in all ADLs and ambulation without device.  Pt has tub shower with glass door, no DME at home.

## 2019-05-16 NOTE — CONSULT NOTE ADULT - SUBJECTIVE AND OBJECTIVE BOX
Patient is a 68y old  Female who presents with a chief complaint of Acute renal failure and Vomiting. (16 May 2019 16:13)      HPI:  JEEVAN LOONEY is a 67 YO Female brought to ER because of vomiting for 3 days.  According to the patient she was admitted to [facility] with left ankle wound treatment and discharged few days ago on PO Cipro and Amoxicillin.  She had vomiting on and off for a few weeks and usually resolves by itself.  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  Lately she diagnosed liver, lung nodules/mets.  Patient referred by oncologist, Dr. Hernández for evaluation.  She states that she was able to keep down some fluids earlier. Pt states that she had mild erythema to L posterior lower leg, had US of BLE at [facility] with CT of chest on Saturday which were negative. Pt states that she has associated generalized weakness. On further evaluation patient has acute renal failure possible secondary to dehydration and diuretics. (16 May 2019 05:43)       ROS:states nausea and vomting improved today. has been treated most recently with sutent for hemangiopericytoma with last CT scans 2019 chest abdomen and pelvis with stable metastatic disease. off sutent for approx 2 weeks with acute illness  Negative except for:    PAST MEDICAL & SURGICAL HISTORY:  Sarcoma: liver  Cancer of leg, right  Kidney stone  Hypertension  Renal calculi  Hypertension  S/P dilatation and curettage: for polyps  Cancer of leg, right: excision of tumor and txted with radiaton therapy  S/P cholecystectomy: appendectomy  History of nephrectomy  S/P arthroscopic surgery of left knee:   S/P tubal ligation  S/P appendectomy: cholecystectomy -   H/O partial nephrectomy: left -   S/P D&C: 2012  S/P  Section: 1968      SOCIAL HISTORY:    FAMILY HISTORY:  Family history of breast cancer in sister (Sibling)  Family history of hypertension in mother  Family history of stroke: father  at 70 yr old      MEDICATIONS  (STANDING):  ATENolol  Tablet 25 milliGRAM(s) Oral daily  cefTRIAXone   IVPB 1 Gram(s) IV Intermittent every 24 hours  dronabinol 2.5 milliGRAM(s) Oral two times a day  famotidine    Tablet 20 milliGRAM(s) Oral daily  heparin  Injectable 5000 Unit(s) SubCutaneous every 8 hours  lactobacillus acidophilus 1 Tablet(s) Oral three times a day with meals  potassium chloride    Tablet ER 10 milliEquivalent(s) Oral three times a day  sodium chloride 0.45% with potassium chloride 20 mEq/L 1000 milliLiter(s) (50 mL/Hr) IV Continuous <Continuous>    MEDICATIONS  (PRN):      Allergies    No Known Allergies    Intolerances        Vital Signs Last 24 Hrs  T(C): 36.8 (16 May 2019 13:06), Max: 37.3 (16 May 2019 05:00)  T(F): 98.2 (16 May 2019 13:06), Max: 99.1 (16 May 2019 05:00)  HR: 71 (16 May 2019 13:06) (66 - 107)  BP: 99/62 (16 May 2019 13:06) (98/63 - 121/72)  BP(mean): --  RR: 17 (16 May 2019 13:06) (17 - 96)  SpO2: 96% (16 May 2019 13:06) (96% - 100%)    PHYSICAL EXAM  General: adult in NAD  HEENT: clear oropharynx, anicteric sclera, pink conjunctivae  Neck: supple  CV: normal S1S2 with no murmur rubs or gallops  Lungs: clear to auscultation, no wheezes, no rhales  Abdomen: soft non-tender non-distended, no hepato/splenomegaly  Ext: no clubbing cyanosis or edema. left leg with bandage not removed.   Skin: no rashes and no petichiae  Neuro: alert and oriented X3 no focal deficits      LABS:    CBC Full  -  ( 16 May 2019 08:31 )  WBC Count : 3.51 K/uL  RBC Count : 2.15 M/uL  Hemoglobin : 8.1 g/dL  Hematocrit : 24.7 %  Platelet Count - Automated : 211 K/uL  Mean Cell Volume : 114.9 fl  Mean Cell Hemoglobin : 37.7 pg  Mean Cell Hemoglobin Concentration : 32.8 gm/dL  Auto Neutrophil # : x  Auto Lymphocyte # : x  Auto Monocyte # : x  Auto Eosinophil # : x  Auto Basophil # : x  Auto Neutrophil % : x  Auto Lymphocyte % : x  Auto Monocyte % : x  Auto Eosinophil % : x  Auto Basophil % : x    05-16    142  |  108  |  32<H>  ----------------------------<  79  3.3<L>   |  22  |  1.90<H>    Ca    8.1<L>      16 May 2019 12:43  Phos  3.0     05-16    TPro  6.5  /  Alb  3.3  /  TBili  0.8  /  DBili  x   /  AST  59<H>  /  ALT  48  /  AlkPhos  207<H>  05-15              BLOOD SMEAR INTERPRETATION:    RADIOLOGY & ADDITIONAL STUDIES:

## 2019-05-16 NOTE — PROGRESS NOTE ADULT - SUBJECTIVE AND OBJECTIVE BOX
Patient is a 68y old  Female who presents with a chief complaint of Acute renal failure and Vomiting. (16 May 2019 11:09)      INTERVAL /OVERNIGHT EVENTS: feels little better    MEDICATIONS  (STANDING):  ATENolol  Tablet 25 milliGRAM(s) Oral daily  cefTRIAXone   IVPB 1 Gram(s) IV Intermittent every 24 hours  dronabinol 2.5 milliGRAM(s) Oral two times a day  famotidine    Tablet 20 milliGRAM(s) Oral daily  heparin  Injectable 5000 Unit(s) SubCutaneous every 8 hours  lactobacillus acidophilus 1 Tablet(s) Oral three times a day with meals  potassium chloride    Tablet ER 10 milliEquivalent(s) Oral three times a day  sodium chloride 0.45% with potassium chloride 20 mEq/L 1000 milliLiter(s) (50 mL/Hr) IV Continuous <Continuous>    MEDICATIONS  (PRN):      Allergies    No Known Allergies    Intolerances        REVIEW OF SYSTEMS:  CONSTITUTIONAL: No fever, weight loss, or fatigue  EYES: No eye pain, visual disturbances, or discharge  ENMT:  No difficulty hearing, tinnitus, vertigo; No sinus or throat pain  NECK: No pain or stiffness  RESPIRATORY: No cough, wheezing, chills or hemoptysis; No shortness of breath  CARDIOVASCULAR: No chest pain, palpitations, dizziness, or leg swelling  GASTROINTESTINAL: No abdominal or epigastric pain. + nausea, vomiting, or hematemesis; No diarrhea or constipation. No melena or hematochezia.  GENITOURINARY: No dysuria, frequency, hematuria, or incontinence  NEUROLOGICAL: No headaches, memory loss, loss of strength, numbness, or tremors  SKIN: No itching, burning, rashes, or lesions   LYMPH NODES: No enlarged glands  ENDOCRINE: No heat or cold intolerance; No hair loss; No polydipsia or polyuria  MUSCULOSKELETAL: No joint pain or swelling; No muscle, back, or extremity pain  PSYCHIATRIC: No depression, anxiety, mood swings, or difficulty sleeping  HEME/LYMPH: No easy bruising, or bleeding gums  ALLERGY AND IMMUNOLOGIC: No hives or eczema    Vital Signs Last 24 Hrs  T(C): 36.8 (16 May 2019 13:06), Max: 37.3 (16 May 2019 05:00)  T(F): 98.2 (16 May 2019 13:06), Max: 99.1 (16 May 2019 05:00)  HR: 71 (16 May 2019 13:06) (66 - 107)  BP: 99/62 (16 May 2019 13:06) (98/63 - 121/72)  BP(mean): --  RR: 17 (16 May 2019 13:06) (17 - 96)  SpO2: 96% (16 May 2019 13:06) (96% - 100%)    PHYSICAL EXAM:  GENERAL: NAD, well-groomed, well-developed  HEAD:  Atraumatic, Normocephalic  EYES: EOMI, PERRLA, conjunctiva and sclera clear  ENMT: No tonsillar erythema, exudates, or enlargement; Moist mucous membranes, Good dentition, No lesions  NECK: Supple, No JVD, Normal thyroid  NERVOUS SYSTEM:  Alert & Oriented X3, Good concentration; Motor Strength 5/5 B/L upper and lower extremities; DTRs 2+ intact and symmetric  CHEST/LUNG: Clear to auscultation bilaterally; No rales, rhonchi, wheezing, or rubs  HEART: Regular rate and rhythm; No murmurs, rubs, or gallops  ABDOMEN: Soft, Nontender, Nondistended; Bowel sounds present  EXTREMITIES:  2+ Peripheral Pulses, No clubbing, cyanosis, or edema  LYMPH: No lymphadenopathy noted  SKIN: No rashes or lesions    LABS:                        8.1    3.51  )-----------( 211      ( 16 May 2019 08:31 )             24.7     16 May 2019 12:43    142    |  108    |  32     ----------------------------<  79     3.3     |  22     |  1.90     Ca    8.1        16 May 2019 12:43  Phos  3.0       16 May 2019 08:31    TPro  6.5    /  Alb  3.3    /  TBili  0.8    /  DBili  x      /  AST  59     /  ALT  48     /  AlkPhos  207    15 May 2019 18:29      Urinalysis Basic - ( 15 May 2019 21:24 )    Color: Yellow / Appearance: Clear / S.020 / pH: x  Gluc: x / Ketone: Trace  / Bili: Negative / Urobili: Negative   Blood: x / Protein: 75 mg/dL / Nitrite: Negative   Leuk Esterase: Small / RBC: 0-2 /HPF / WBC 3-5   Sq Epi: x / Non Sq Epi: Few / Bacteria: Moderate      CAPILLARY BLOOD GLUCOSE          RADIOLOGY & ADDITIONAL TESTS:    Notes Reviewed:  [x ] YES  [ ] NO    Care Discussed with Consultants/Other Providers [ x] YES  [ ] NO

## 2019-05-16 NOTE — GOALS OF CARE CONVERSATION - PERSONAL ADVANCE DIRECTIVE - CONVERSATION DETAILS
Met pt, unsure if she has hcp, may have completed at [facility]. message left for Jyoti regarding same. pt hcp is daughter Jyoti, pt has had discussion of her wishes, no directives in place at this time. pt follows hemonc & wound care at home.  PC RN contact # given, will follow up

## 2019-05-16 NOTE — DIETITIAN INITIAL EVALUATION ADULT. - ORAL INTAKE PTA
Pt reports decreased appetite/intake x5 days PTA. States she began vomiting and was unable to tolerate po intake. Typically has fair appetite intake, states she doesn't usually have an appetite for breakfast and will pick on some items later in the day (chicken, fish, vegetables). Pt reports decreased appetite/intake x4 days PTA. States she began vomiting and was unable to tolerate po intake. Typically has fair appetite intake, states she doesn't usually have an appetite for breakfast and will pick on some items later in the day (chicken, fish, vegetables).

## 2019-05-16 NOTE — H&P ADULT - NSHPLABSRESULTS_GEN_ALL_CORE
8.5    3.31  )-----------( 188      ( 15 May 2019 18:29 )             25.6     15 May 2019 18:29    141    |  106    |  39     ----------------------------<  99     3.6     |  23     |  2.30     Ca    8.8        15 May 2019 18:29    TPro  6.5    /  Alb  3.3    /  TBili  0.8    /  DBili  x      /  AST  59     /  ALT  48     /  AlkPhos  207    15 May 2019 18:29    LIVER FUNCTIONS - ( 15 May 2019 18:29 )  Alb: 3.3 g/dL / Pro: 6.5 g/dL / ALK PHOS: 207 U/L / ALT: 48 U/L / AST: 59 U/L / GGT: x             CAPILLARY BLOOD GLUCOSE        CARDIAC MARKERS ( 15 May 2019 18:29 )  <.015 ng/mL / x     / x     / x     / x          Urinalysis Basic - ( 15 May 2019 21:24 )    Color: Yellow / Appearance: Clear / S.020 / pH: x  Gluc: x / Ketone: Trace  / Bili: Negative / Urobili: Negative   Blood: x / Protein: 75 mg/dL / Nitrite: Negative   Leuk Esterase: Small / RBC: 0-2 /HPF / WBC 3-5   Sq Epi: x / Non Sq Epi: Few / Bacteria: Moderate

## 2019-05-16 NOTE — CONSULT NOTE ADULT - PROBLEM SELECTOR RECOMMENDATION 4
per medicine/oncology.    Thank you for consulting us and involving us in the management of this most interesting and challenging case.     We will follow along in the care of this patient.

## 2019-05-16 NOTE — H&P ADULT - NSICDXPASTSURGICALHX_GEN_ALL_CORE_FT
PAST SURGICAL HISTORY:  Cancer of leg, right excision of tumor and txted with radiaton therapy    H/O partial nephrectomy left -     History of nephrectomy     S/P appendectomy cholecystectomy -     S/P arthroscopic surgery of left knee     S/P  Section 1968    S/P cholecystectomy appendectomy    S/P D&C 2012    S/P dilatation and curettage for polyps    S/P tubal ligation

## 2019-05-16 NOTE — DIETITIAN INITIAL EVALUATION ADULT. - PROBLEM/PLAN-5
COLONOSCOPY REPORT.    GASTROENTEROLOGIST: Fernandez Jacobson MD    ASSISTANT: None    2017    Jean-Claude Urbina  : 1951  MRN: [id number]    PRE-OPERATED DIAGNOSES / INDICATIONS:  · Colon cancer screening.    POSTOPERATIVE DIAGNOSES:  · 5 mm descending colon polyp. Hot snare polypectomy done. Polyp got completely fulgurated/ablated while attempting to snare.  · Mild right-sided and mild-to-moderate left-sided diverticulosis.  · Mild internal hemorrhoids.    OPERATION PERFORMED:  · Colonoscopy up to the terminal ileum with conscious sedation. Pediatric scope used.  · Hot snare polypectomy ×1 done. Polyp got completely fulgurated/ablated.  · Moderate sedation administered by nursing staff, directly supervised by me for 20 minutes.    EBL:  None.    ANESTHESIA:  Demerol 50 mg IV.  Versed 6 mg IV.  Oxygen nasal cannula  Zofran 4 mg IV    SPECIMENS: see lab results    COMPLICATIONS: None    DESCRIPTION OF OPERATION:  The risks and benefits of the procedure were explained to the patient who demonstrated an understanding and consented to the procedure.  Informed consent discussion included discussion of risks of sedation and procedure. The history was reviewed and the patient was examined. The patient was stable for the procedure and monitored throughout.     Moderate sedation administered by nursing staff, directly supervised by me.    Digital rectal exam revealed normal anal tone.  No mass lesions were felt on rectal exam.    The endoscope was then introduced into the rectum and advanced to the cecum.   Appendiceal orifice was visualized.     · The prep was adequate.  · Terminal ileum was intubated. Mucosa in distal 10-15 cm of terminal ileum was normal.  · Mucosa in the cecum, ascending, transverse, descending, sigmoid colon and rectum was normal.  · 5 mm descending colon polyp. Hot snare polypectomy done. Polyp got completely fulgurated/ablated while attempting to snare.  · Mild right-sided and mild-to-moderate  DISPLAY PLAN FREE TEXT left-sided diverticulosis.  · Mild internal hemorrhoids.    At the anal verge on retroflexion mild internal hemorrhoids were noticed.  No mass lesions or fissures were noticed in the anal canal.    Following the procedure, the colon was decompressed, the endoscope removed and the procedure was terminated. Immediate postoperative condition of patient was normal and transferred to recovery area.     RECOMMENDATIONS:  · Supportive therapy for hemorrhoids and diverticulosis.  · Screening colonoscopy in 5 years.

## 2019-05-16 NOTE — DIETITIAN INITIAL EVALUATION ADULT. - ETIOLOGY
related to related to inadequate energy/protein intake in setting of chronic catabolic illness (cancer)

## 2019-05-16 NOTE — PROVIDER CONTACT NOTE (CHANGE IN STATUS NOTIFICATION) - ASSESSMENT
Patient is a 67yo F presenting with a 3.5 x 3.5 x 1.5 wound at the [facility] etiology is unknown possible scratch or animal bite; she was prescribed chemotherapy for sarcoma which was discontinued 2 weeks ago she was seeing wound care at [facility] wound is painful

## 2019-05-16 NOTE — H&P ADULT - HISTORY OF PRESENT ILLNESS
JEEVAN LOONEY is a 67 YO Female brought to ER because of vomiting for 3 days.  According to the patient she was admitted to [facility] with left ankle wound treatment and discharged few days ago on PO Cipro and Amoxicillin.  She had vomiting on and off for a few weeks and usually resolves by itself.  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  Lately she diagnosed liver, lung nodules/mets.  Patient referred by oncologist, Dr. Hernández for evaluation.  She states that she was able to keep down some fluids earlier. Pt states that she had mild erythema to L posterior lower leg, had US of BLE at [place] with CT of chest on Saturday which were negative. Pt states that she has associated generalized weakness. On further evaluation patient has acute renal failure possible secondary to dehydration and diuretics.

## 2019-05-16 NOTE — CONSULT NOTE ADULT - SUBJECTIVE AND OBJECTIVE BOX
HPI:  JEEVAN LOONEY is a 69 YO Female brought to ER because of vomiting for 3 days.  According to the patient she was admitted to [facility] with left ankle wound treatment and discharged few days ago on PO Cipro and Amoxicillin.  She had vomiting on and off for a few weeks and usually resolves by itself.  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  Lately she diagnosed liver, lung nodules/mets.  Patient referred by oncologist, Dr. Hernández for evaluation.  She states that she was able to keep down some fluids earlier. Pt states that she had mild erythema to L posterior lower leg, had US of BLE at [place] with CT of chest on Saturday which were negative. Pt states that she has associated generalized weakness. On further evaluation patient has acute renal failure possible secondary to dehydration and diuretics. (16 May 2019 05:43)      PAST MEDICAL & SURGICAL HISTORY:  Sarcoma: liver  Cancer of leg, right  Kidney stone  Hypertension  Renal calculi  Hypertension  S/P dilatation and curettage: for polyps  Cancer of leg, right: excision of tumor and txted with radiaton therapy  S/P cholecystectomy: appendectomy  History of nephrectomy  S/P arthroscopic surgery of left knee:   S/P tubal ligation  S/P appendectomy: cholecystectomy -   H/O partial nephrectomy: left -   S/P D&C: 2012  S/P  Section: 1968      Antimicrobials  cefTRIAXone   IVPB 1 Gram(s) IV Intermittent every 24 hours      Immunological      Other  ATENolol  Tablet 50 milliGRAM(s) Oral daily  famotidine    Tablet 20 milliGRAM(s) Oral daily  heparin  Injectable 5000 Unit(s) SubCutaneous every 8 hours  sodium chloride 0.45%. 1000 milliLiter(s) IV Continuous <Continuous>      Allergies    No Known Allergies    Intolerances        SOCIAL HISTORY:    FAMILY HISTORY:  Family history of breast cancer in sister (Sibling)  Family history of hypertension in mother  Family history of stroke: father  at 70 yr old      ROS:    EYES:  Negative  blurry vision or double vision  GASTROINTESTINAL:  Negative for nausea, vomiting, diarrhea  -otherwise negative except for subjective    Vital Signs Last 24 Hrs  T(C): 37.3 (16 May 2019 05:00), Max: 37.3 (16 May 2019 05:00)  T(F): 99.1 (16 May 2019 05:00), Max: 99.1 (16 May 2019 05:00)  HR: 66 (16 May 2019 05:00) (66 - 107)  BP: 101/57 (16 May 2019 05:00) (98/63 - 121/72)  BP(mean): --  RR: 96 (16 May 2019 05:00) (17 - 96)  SpO2: 96% (16 May 2019 05:00) (96% - 100%)    PE:  WDWN in no distress  HEENT:  NC, PERRL, sclerae anicteric, conjunctivae clear, EOMI.  Sinuses nontender, no nasal exudate.  No buccal or pharyngeal lesions, erythema or exudate  Neck:  Supple, no adenopathy  Lungs:  No accessory muscle use, breathing comfortably  Cor:  RRR  Extrem:  No cyanosis or edema  Skin:  No rashes.  Neuro: grossly intact  Musc: moving all limbs freely, no focal deficits    LABS:                        8.1    3.51  )-----------( 211      ( 16 May 2019 08:31 )             24.7       WBC Count: 3.51 K/uL (19 @ 08:31)  WBC Count: 3.31 K/uL (05-15-19 @ 18:29)      05-15    141  |  106  |  39<H>  ----------------------------<  99  3.6   |  23  |  2.30<H>    Ca    8.8      15 May 2019 18:29  Phos  3.0         TPro  6.5  /  Alb  3.3  /  TBili  0.8  /  DBili  x   /  AST  59<H>  /  ALT  48  /  AlkPhos  207<H>  05-15      Creatinine, Serum: 2.30 mg/dL (05-15-19 @ 18:29)      Urinalysis Basic - ( 15 May 2019 21:24 )    Color: Yellow / Appearance: Clear / S.020 / pH: x  Gluc: x / Ketone: Trace  / Bili: Negative / Urobili: Negative   Blood: x / Protein: 75 mg/dL / Nitrite: Negative   Leuk Esterase: Small / RBC: 0-2 /HPF / WBC 3-5   Sq Epi: x / Non Sq Epi: Few / Bacteria: Moderate            MICROBIOLOGY:        RADIOLOGY & ADDITIONAL STUDIES:    -- HPI:  JEEVAN LOONEY is a 69 YO Female brought to ER because of vomiting for 3 days.  According to the patient she was admitted to [facility] with left ankle wound treatment and discharged few days ago on PO Cipro and Amoxicillin.  She had vomiting on and off for a few weeks and usually resolves by itself.  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  Lately she diagnosed liver, lung nodules/mets.  Patient referred by oncologist, Dr. Hernández for evaluation.  She states that she was able to keep down some fluids earlier. Pt states that she had mild erythema to L posterior lower leg, had US of BLE at [place] with CT of chest on Saturday which were negative. Pt states that she has associated generalized weakness. On further evaluation patient has acute renal failure possible secondary to dehydration and diuretics. (16 May 2019 05:43)      PAST MEDICAL & SURGICAL HISTORY:  Sarcoma: liver  Cancer of leg, right  Kidney stone  Hypertension  Renal calculi  Hypertension  S/P dilatation and curettage: for polyps  Cancer of leg, right: excision of tumor and txted with radiaton therapy  S/P cholecystectomy: appendectomy  History of nephrectomy  S/P arthroscopic surgery of left knee:   S/P tubal ligation  S/P appendectomy: cholecystectomy -   H/O partial nephrectomy: left -   S/P D&C: 2012  S/P  Section: 1968      Antimicrobials  cefTRIAXone   IVPB 1 Gram(s) IV Intermittent every 24 hours      Immunological      Other  ATENolol  Tablet 50 milliGRAM(s) Oral daily  famotidine    Tablet 20 milliGRAM(s) Oral daily  heparin  Injectable 5000 Unit(s) SubCutaneous every 8 hours  sodium chloride 0.45%. 1000 milliLiter(s) IV Continuous <Continuous>      Allergies    No Known Allergies    Intolerances        SOCIAL HISTORY:    FAMILY HISTORY:  Family history of breast cancer in sister (Sibling)  Family history of hypertension in mother  Family history of stroke: father  at 70 yr old      ROS:    EYES:  Negative  blurry vision or double vision  GASTROINTESTINAL:  Negative for nausea, vomiting, diarrhea  -otherwise negative except for subjective    Vital Signs Last 24 Hrs  T(C): 37.3 (16 May 2019 05:00), Max: 37.3 (16 May 2019 05:00)  T(F): 99.1 (16 May 2019 05:00), Max: 99.1 (16 May 2019 05:00)  HR: 66 (16 May 2019 05:00) (66 - 107)  BP: 101/57 (16 May 2019 05:00) (98/63 - 121/72)  BP(mean): --  RR: 96 (16 May 2019 05:00) (17 - 96)  SpO2: 96% (16 May 2019 05:00) (96% - 100%)    PE:  WDWN in no distress  HEENT:  NC, PERRL, sclerae anicteric, conjunctivae clear, EOMI.  Sinuses nontender, no nasal exudate.  No buccal or pharyngeal lesions, erythema or exudate  Neck:  Supple, no adenopathy  Lungs:  No accessory muscle use, breathing comfortably  Cor:  RRR  Extrem:  No cyanosis or edema  Skin:  ulcer left laterral LE with minimal surrounding erythema  Neuro: grossly intact  Musc: moving all limbs freely, no focal deficits    LABS:                        8.1    3.51  )-----------( 211      ( 16 May 2019 08:31 )             24.7       WBC Count: 3.51 K/uL (19 @ 08:31)  WBC Count: 3.31 K/uL (05-15-19 @ 18:29)      05-15    141  |  106  |  39<H>  ----------------------------<  99  3.6   |  23  |  2.30<H>    Ca    8.8      15 May 2019 18:29  Phos  3.0         TPro  6.5  /  Alb  3.3  /  TBili  0.8  /  DBili  x   /  AST  59<H>  /  ALT  48  /  AlkPhos  207<H>  05-15      Creatinine, Serum: 2.30 mg/dL (05-15-19 @ 18:29)      Urinalysis Basic - ( 15 May 2019 21:24 )    Color: Yellow / Appearance: Clear / S.020 / pH: x  Gluc: x / Ketone: Trace  / Bili: Negative / Urobili: Negative   Blood: x / Protein: 75 mg/dL / Nitrite: Negative   Leuk Esterase: Small / RBC: 0-2 /HPF / WBC 3-5   Sq Epi: x / Non Sq Epi: Few / Bacteria: Moderate            MICROBIOLOGY:        RADIOLOGY & ADDITIONAL STUDIES:    --

## 2019-05-16 NOTE — PHYSICAL THERAPY INITIAL EVALUATION ADULT - PERTINENT HX OF CURRENT PROBLEM, REHAB EVAL
LILY KENDRICK is an 86 YO male brought In to the ED by son from Dr Sawant's office wuth complaint of swelling an pain to the left foot mainly 5th toe. As per son who is with him the pain and swelling started few days ago and now become more discolored.  Patient was seen by Dr Sawant today and referred to the ED for "gangrenous changes".

## 2019-05-16 NOTE — CONSULT NOTE ADULT - ASSESSMENT
Acute on CKD Stage 3  Dehydration  Nausea/Vomiting  LE Wound  Liver Sarcoma on chemo    -TOM is likely pre-renal in the setting of dehydration and use of HCTZ  -Agree with IVF as ordered  -Check urine indices; check CKD labs; check CPK and uric acid  -Check renal sonogram  -ABx; ID eval  -Avoid HCTZ; continue with atenolol as ordered  -Monitor chemistries    Thank you Acute on CKD Stage 3  Dehydration  Nausea/Vomiting  LE Wound  Liver Sarcoma on chemo  HTN    -TOM is likely pre-renal in the setting of dehydration and use of HCTZ  -Agree with IVF as ordered  -Check urine indices; check CKD labs; check CPK and uric acid  -Check renal sonogram  -ABx; ID eval  -Avoid HCTZ; continue with atenolol as ordered  -Monitor chemistries    Thank you

## 2019-05-16 NOTE — H&P ADULT - NSICDXFAMILYHX_GEN_ALL_CORE_FT
H/O cardiac pacemaker    History of appendectomy FAMILY HISTORY:  Family history of hypertension in mother  Family history of stroke, father  at 70 yr old    Sibling  Still living? No  Family history of breast cancer in sister, Age at diagnosis: Age Unknown

## 2019-05-16 NOTE — H&P ADULT - NSICDXPASTMEDICALHX_GEN_ALL_CORE_FT
PAST MEDICAL HISTORY:  Cancer of leg, right     Hypertension     Hypertension     Kidney stone     Renal calculi     Sarcoma liver

## 2019-05-16 NOTE — CHART NOTE - NSCHARTNOTEFT_GEN_A_CORE
Upon Nutritional Assessment by the Registered Dietitian your patient was determined to meet criteria / has evidence of the following diagnosis/diagnoses:          [ ]  Mild Protein Calorie Malnutrition        [X]  Moderate Protein Calorie Malnutrition        [ ] Severe Protein Calorie Malnutrition        [ ] Unspecified Protein Calorie Malnutrition        [ ] Underweight / BMI <19        [ ] Morbid Obesity / BMI > 40      Findings as based on:  [X] Comprehensive nutrition assessment   [X] Nutrition Focused Physical Exam  [X] Other: Malnutrition (moderate, chronic) related to inadequate energy/protein intake in setting of chronic catabolic illness (cancer) as evidenced by 16.3% wt loss x1 year, decreased po intake x4 days PTA, +3 edema, muscle/fat loss.      Nutrition Plan/Recommendations:      1) Liberalize diet to low sodium. Encourage po intake of nutrient dense meals/snacks. Pt encouraged to focus on small frequent meals as tolerated. Provided verbal nutrition education on increased kcal/protein in setting of catabolic illness. Hydration encouraged in light of TOM.  2) Pt declined oral nutritional supplements such as Ensure Enlive/Ensure Clear.   3) Multivitamin as per MD's discretion.   4) Continue marinol supplementation for appetite stimulation.  5) Monitor po intake, weights, BMs, skin integrity, tolerance of diet consistency, and nutrition related labs.   6) RD to remain available.    PROVIDER Section:     By signing this assessment you are acknowledging and agree with the diagnosis/diagnoses assigned by the Registered Dietitian    Patrica Min RD

## 2019-05-16 NOTE — H&P ADULT - NEUROLOGICAL DETAILS
deep reflexes intact/no spontaneous movement/sensation intact/cranial nerves intact/superficial reflexes intact/alert and oriented x 3

## 2019-05-16 NOTE — CONSULT NOTE ADULT - PROBLEM SELECTOR RECOMMENDATION 9
Minimal erythema around wound and ulcer does not appear grossly infected. Reasonable to continue ceftriaxone now until clear that patient can tolerate PO.  Would be helpful to obtain any micro data from [facility] as well as noted from ID at that hospital to understand basis of abx selection and duration recommendations.

## 2019-05-16 NOTE — DIETITIAN INITIAL EVALUATION ADULT. - ENERGY NEEDS
Wt: 170 pounds, Ht: 66 inches, BMI: 27.4 kgm/2, IBW: 130 pounds  +/-10%, %IBW: 131%  +3 B/L feet, ankle, leg edema, no pressure injuries

## 2019-05-16 NOTE — CONSULT NOTE ADULT - SUBJECTIVE AND OBJECTIVE BOX
Patient is a 68y old  Female who presents with a chief complaint of      HPI: 67 yo Female with a PMH of sarcoma of liver, lung nodules, HTN, and CKD Stage 3 referred by oncologist, Dr. Hernández for evaluation. Pt c/o vomiting x 2 days. Pt states that she has had intermittent vomiting for a few weeks since she was admitted over a week ago at [facility] for L lower leg wound, dc home last week on cipro and amoxicillin.  States that she was able to keep down some fluids earlier. Pt states that she had mild erythema to L posterior lower leg, had US of BLE at [facility] with CT of chest on Saturday which was negative. Pt states that she has associated generalized weakness. The patient has been complaining of nausea and vomiting with poor intake for the past few days. She has been taking HCTZ daily as well. Pt denies fever, diarrhea, abdominal pain, CP, SOB. Renal consulted for elevated BUN/Cr. Patient follows with Nephrology for [facility]. Has a history of a partial left nephrectomy.        PAST MEDICAL & SURGICAL HISTORY:  Sarcoma: liver  Cancer of leg, right  Kidney stone  Hypertension  Renal calculi  Hypertension  S/P dilatation and curettage: for polyps  Cancer of leg, right: excision of tumor and txted with radiaton therapy  S/P cholecystectomy: appendectomy  History of nephrectomy  S/P arthroscopic surgery of left knee:   S/P tubal ligation  S/P appendectomy: cholecystectomy -   H/O partial nephrectomy: left -   S/P D&C: 2012  S/P  Section: 1968       FAMILY HISTORY:  Family history of breast cancer in sister (Sibling)  Family history of hypertension in mother  Family history of stroke: father  at 70 yr old  NC    Social History:Non smoker    MEDICATIONS  (STANDING):  ATENolol  Tablet 50 milliGRAM(s) Oral daily  cefTRIAXone   IVPB 1 Gram(s) IV Intermittent every 24 hours  heparin  Injectable 5000 Unit(s) SubCutaneous every 12 hours  sodium chloride 0.45%. 1000 milliLiter(s) (75 mL/Hr) IV Continuous <Continuous>    MEDICATIONS  (PRN):   Meds reviewed    Allergies    No Known Allergies    Intolerances         REVIEW OF SYSTEMS:    CONSTITUTIONAL: Poor intake  EYES: No eye pain, visual disturbances, or discharge  ENMT:  No difficulty hearing, tinnitus, vertigo; No sinus or throat pain  NECK: No pain or stiffness  BREASTS: No pain, masses, or nipple discharge  RESPIRATORY: No SOB. No wheeze. No WADE  CARDIOVASCULAR: No chest pain, palpitations, dizziness,   GASTROINTESTINAL: +Nausea, +Vomiting, No abdominal or epigastric pain. No hematemesis; No diarrhea or constipation. No melena   GENITOURINARY: No dysuria, frequency, hematuria, or incontinence  NEUROLOGICAL: No headaches, memory loss, loss of strength, numbness, or tremors  SKIN: No Diffuse erythema, no blisters  LYMPH NODES: No enlarged glands  ENDOCRINE: No heat or cold intolerance; No hair loss  MUSCULOSKELETAL: No joint pain or swelling   PSYCHIATRIC: No depression, anxiety, mood swings, or difficulty sleeping  HEME/LYMPH: No easy bruising, or bleeding gums  ALLERGY AND IMMUNOLOGIC: No hives or eczema      Vital Signs Last 24 Hrs  T(C): 37.3 (16 May 2019 05:00), Max: 37.3 (16 May 2019 05:00)  T(F): 99.1 (16 May 2019 05:00), Max: 99.1 (16 May 2019 05:00)  HR: 66 (16 May 2019 05:00) (66 - 107)  BP: 101/57 (16 May 2019 05:00) (98/63 - 121/72)  BP(mean): --  RR: 96 (16 May 2019 05:00) (17 - 96)  SpO2: 96% (16 May 2019 05:00) (96% - 100%)  Daily     Daily     PHYSICAL EXAM:    GENERAL: No distress  HEAD:  Atraumatic, Normocephalic  EYES: Conjunctiva and sclera clear  ENMT: slightly dry oral mucosa; parched lips  NECK: Supple  NERVOUS SYSTEM:  Alert & Oriented X3, Good concentration  CHEST/LUNG: Clear to percussion bilaterally; No rales, rhonchi, wheezing, or rubs  HEART: Regular rate and rhythm; No murmurs, rubs, or gallops  ABDOMEN: Soft, Nontender, Nondistended; Bowel sounds present  EXTREMITIES: +dressing over wound noted to L lower leg, +swelling noted to B feet, +mild BLE swelling noted  LYMPH: No lymphadenopathy noted  SKIN: No rashes or lesions      LABS:                        8.5    3.31  )-----------( 188      ( 15 May 2019 18:29 )             25.6     05-15    141  |  106  |  39<H>  ----------------------------<  99  3.6   |  23  |  2.30<H>    Ca    8.8      15 May 2019 18:29    TPro  6.5  /  Alb  3.3  /  TBili  0.8  /  DBili  x   /  AST  59<H>  /  ALT  48  /  AlkPhos  207<H>  05-15      Urinalysis Basic - ( 15 May 2019 21:24 )    Color: Yellow / Appearance: Clear / S.020 / pH: x  Gluc: x / Ketone: Trace  / Bili: Negative / Urobili: Negative   Blood: x / Protein: 75 mg/dL / Nitrite: Negative   Leuk Esterase: Small / RBC: 0-2 /HPF / WBC 3-5   Sq Epi: x / Non Sq Epi: Few / Bacteria: Moderate              RADIOLOGY & ADDITIONAL TESTS:

## 2019-05-16 NOTE — DIETITIAN INITIAL EVALUATION ADULT. - FACTORS AFF FOOD INTAKE
other (specify)/chemotherapy related changes in taste/appetite. States that she has a metallic taste with foods and water intake. Also has severely decreased appetite/intake since beginning chemotherapy x1 year ago./vomiting

## 2019-05-16 NOTE — DIETITIAN INITIAL EVALUATION ADULT. - OTHER INFO
Pt seen for nutrition referral for assessment/education. 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 Pt reports improving po intake since admission. Able to tolerate applesauce, roll, salad today. Endorses UBW of 170 pounds (stated current wt in medical chart of 143 pounds is incorrect) and that she was weighed by PMD/oncologist recently and current is 170 pounds, down from her weight x1 year ago of 203 pounds. Denied current nausea, vomiting (last episode 4 pm yesterday), diarrhea, constipation (last BM today 5/16). Denied difficulties chewing/swallowing, NKFA.

## 2019-05-16 NOTE — CONSULT NOTE ADULT - ASSESSMENT
67 YO Female brought to ER because of vomiting for 3 days and inability to keep down her PO Cipro and Amoxicillin being given to treat a left leg ulcer with surrounding cellulitis due to chemo meds for sarcoma of right thigh/leg that is metastatic.

## 2019-05-16 NOTE — DIETITIAN INITIAL EVALUATION ADULT. - NS AS NUTRI INTERV MEALS SNACK
Liberalized diet to low sodium. Encourage po intake of nutrient dense meals/snacks. Pt encouraged to focus on small frequent meals as tolerated. Provided verbal nutrition education on increased kcal/protein in setting of catabolic illness. Hydration encouraged in light of TOM.

## 2019-05-17 ENCOUNTER — TRANSCRIPTION ENCOUNTER (OUTPATIENT)
Age: 69
End: 2019-05-17

## 2019-05-17 VITALS
RESPIRATION RATE: 17 BRPM | HEART RATE: 68 BPM | SYSTOLIC BLOOD PRESSURE: 138 MMHG | TEMPERATURE: 98 F | DIASTOLIC BLOOD PRESSURE: 76 MMHG | OXYGEN SATURATION: 100 %

## 2019-05-17 LAB
ANION GAP SERPL CALC-SCNC: 10 MMOL/L — SIGNIFICANT CHANGE UP (ref 5–17)
BUN SERPL-MCNC: 31 MG/DL — HIGH (ref 7–23)
CALCIUM SERPL-MCNC: 8.1 MG/DL — LOW (ref 8.5–10.1)
CHLORIDE SERPL-SCNC: 108 MMOL/L — SIGNIFICANT CHANGE UP (ref 96–108)
CO2 SERPL-SCNC: 24 MMOL/L — SIGNIFICANT CHANGE UP (ref 22–31)
CREAT SERPL-MCNC: 1.7 MG/DL — HIGH (ref 0.5–1.3)
GLUCOSE SERPL-MCNC: 90 MG/DL — SIGNIFICANT CHANGE UP (ref 70–99)
HCT VFR BLD CALC: 25 % — LOW (ref 34.5–45)
HCV AB S/CO SERPL IA: 0.09 S/CO — SIGNIFICANT CHANGE UP (ref 0–0.99)
HCV AB SERPL-IMP: SIGNIFICANT CHANGE UP
HGB BLD-MCNC: 8.2 G/DL — LOW (ref 11.5–15.5)
MCHC RBC-ENTMCNC: 32.8 GM/DL — SIGNIFICANT CHANGE UP (ref 32–36)
MCHC RBC-ENTMCNC: 37.8 PG — HIGH (ref 27–34)
MCV RBC AUTO: 115.2 FL — HIGH (ref 80–100)
NRBC # BLD: 0 /100 WBCS — SIGNIFICANT CHANGE UP (ref 0–0)
OB PNL STL: NEGATIVE — SIGNIFICANT CHANGE UP
PLATELET # BLD AUTO: 157 K/UL — SIGNIFICANT CHANGE UP (ref 150–400)
POTASSIUM SERPL-MCNC: 4 MMOL/L — SIGNIFICANT CHANGE UP (ref 3.5–5.3)
POTASSIUM SERPL-SCNC: 4 MMOL/L — SIGNIFICANT CHANGE UP (ref 3.5–5.3)
RBC # BLD: 2.17 M/UL — LOW (ref 3.8–5.2)
RBC # FLD: 15 % — HIGH (ref 10.3–14.5)
SODIUM SERPL-SCNC: 142 MMOL/L — SIGNIFICANT CHANGE UP (ref 135–145)
WBC # BLD: 2.96 K/UL — LOW (ref 3.8–10.5)
WBC # FLD AUTO: 2.96 K/UL — LOW (ref 3.8–10.5)

## 2019-05-17 PROCEDURE — 84550 ASSAY OF BLOOD/URIC ACID: CPT

## 2019-05-17 PROCEDURE — 84156 ASSAY OF PROTEIN URINE: CPT

## 2019-05-17 PROCEDURE — 74019 RADEX ABDOMEN 2 VIEWS: CPT

## 2019-05-17 PROCEDURE — 84100 ASSAY OF PHOSPHORUS: CPT

## 2019-05-17 PROCEDURE — 36415 COLL VENOUS BLD VENIPUNCTURE: CPT

## 2019-05-17 PROCEDURE — 97161 PT EVAL LOW COMPLEX 20 MIN: CPT

## 2019-05-17 PROCEDURE — 71046 X-RAY EXAM CHEST 2 VIEWS: CPT

## 2019-05-17 PROCEDURE — 84540 ASSAY OF URINE/UREA-N: CPT

## 2019-05-17 PROCEDURE — 97116 GAIT TRAINING THERAPY: CPT

## 2019-05-17 PROCEDURE — 97530 THERAPEUTIC ACTIVITIES: CPT

## 2019-05-17 PROCEDURE — 83970 ASSAY OF PARATHORMONE: CPT

## 2019-05-17 PROCEDURE — 82550 ASSAY OF CK (CPK): CPT

## 2019-05-17 PROCEDURE — 93005 ELECTROCARDIOGRAM TRACING: CPT

## 2019-05-17 PROCEDURE — 86803 HEPATITIS C AB TEST: CPT

## 2019-05-17 PROCEDURE — 84300 ASSAY OF URINE SODIUM: CPT

## 2019-05-17 PROCEDURE — 81001 URINALYSIS AUTO W/SCOPE: CPT

## 2019-05-17 PROCEDURE — 93970 EXTREMITY STUDY: CPT

## 2019-05-17 PROCEDURE — 87205 SMEAR GRAM STAIN: CPT

## 2019-05-17 PROCEDURE — 74176 CT ABD & PELVIS W/O CONTRAST: CPT

## 2019-05-17 PROCEDURE — 80048 BASIC METABOLIC PNL TOTAL CA: CPT

## 2019-05-17 PROCEDURE — 82270 OCCULT BLOOD FECES: CPT

## 2019-05-17 PROCEDURE — 83880 ASSAY OF NATRIURETIC PEPTIDE: CPT

## 2019-05-17 PROCEDURE — 96374 THER/PROPH/DIAG INJ IV PUSH: CPT

## 2019-05-17 PROCEDURE — 76770 US EXAM ABDO BACK WALL COMP: CPT

## 2019-05-17 PROCEDURE — 82570 ASSAY OF URINE CREATININE: CPT

## 2019-05-17 PROCEDURE — 99285 EMERGENCY DEPT VISIT HI MDM: CPT | Mod: 25

## 2019-05-17 PROCEDURE — 83690 ASSAY OF LIPASE: CPT

## 2019-05-17 PROCEDURE — 82310 ASSAY OF CALCIUM: CPT

## 2019-05-17 PROCEDURE — 84484 ASSAY OF TROPONIN QUANT: CPT

## 2019-05-17 PROCEDURE — 80053 COMPREHEN METABOLIC PANEL: CPT

## 2019-05-17 PROCEDURE — 85027 COMPLETE CBC AUTOMATED: CPT

## 2019-05-17 RX ORDER — LACTOBACILLUS ACIDOPHILUS 100MM CELL
2 CAPSULE ORAL
Qty: 0 | Refills: 0 | DISCHARGE
Start: 2019-05-17

## 2019-05-17 RX ORDER — CEFUROXIME AXETIL 250 MG
500 TABLET ORAL EVERY 12 HOURS
Refills: 0 | Status: DISCONTINUED | OUTPATIENT
Start: 2019-05-17 | End: 2019-05-17

## 2019-05-17 RX ORDER — FAMOTIDINE 10 MG/ML
1 INJECTION INTRAVENOUS
Qty: 0 | Refills: 0 | DISCHARGE
Start: 2019-05-17

## 2019-05-17 RX ORDER — CEFUROXIME AXETIL 250 MG
1 TABLET ORAL
Qty: 14 | Refills: 0
Start: 2019-05-17 | End: 2019-05-23

## 2019-05-17 RX ORDER — DRONABINOL 2.5 MG
1 CAPSULE ORAL
Qty: 60 | Refills: 0
Start: 2019-05-17 | End: 2019-06-15

## 2019-05-17 RX ORDER — AMOXICILLIN 250 MG/5ML
0 SUSPENSION, RECONSTITUTED, ORAL (ML) ORAL
Qty: 0 | Refills: 0 | DISCHARGE

## 2019-05-17 RX ADMIN — Medication 500 MILLIGRAM(S): at 17:16

## 2019-05-17 RX ADMIN — HEPARIN SODIUM 5000 UNIT(S): 5000 INJECTION INTRAVENOUS; SUBCUTANEOUS at 05:09

## 2019-05-17 RX ADMIN — Medication 1 TABLET(S): at 12:05

## 2019-05-17 RX ADMIN — HEPARIN SODIUM 5000 UNIT(S): 5000 INJECTION INTRAVENOUS; SUBCUTANEOUS at 13:28

## 2019-05-17 RX ADMIN — Medication 10 MILLIEQUIVALENT(S): at 05:10

## 2019-05-17 RX ADMIN — FAMOTIDINE 20 MILLIGRAM(S): 10 INJECTION INTRAVENOUS at 12:05

## 2019-05-17 RX ADMIN — Medication 1 TABLET(S): at 08:00

## 2019-05-17 RX ADMIN — Medication 1 TABLET(S): at 17:16

## 2019-05-17 RX ADMIN — Medication 2.5 MILLIGRAM(S): at 17:15

## 2019-05-17 RX ADMIN — Medication 2.5 MILLIGRAM(S): at 05:10

## 2019-05-17 NOTE — PROGRESS NOTE ADULT - SUBJECTIVE AND OBJECTIVE BOX
[INTERVAL HX: ]  Patient seen and examined;  Chart reviewed and events noted;   states feels better.   appetite better  No CP, no SOB.   denies vomitting this AM  c/o BL LE edema    MEDICATIONS  (STANDING):  cefuroxime   Tablet 500 milliGRAM(s) Oral every 12 hours  dronabinol 2.5 milliGRAM(s) Oral two times a day  famotidine    Tablet 20 milliGRAM(s) Oral daily  heparin  Injectable 5000 Unit(s) SubCutaneous every 8 hours  lactobacillus acidophilus 1 Tablet(s) Oral three times a day with meals  potassium chloride    Tablet ER 10 milliEquivalent(s) Oral three times a day  sodium chloride 0.45% with potassium chloride 20 mEq/L 1000 milliLiter(s) (50 mL/Hr) IV Continuous <Continuous>    MEDICATIONS  (PRN):      Vital Signs Last 24 Hrs  T(C): 36.8 (17 May 2019 04:29), Max: 36.9 (16 May 2019 21:16)  T(F): 98.2 (17 May 2019 04:29), Max: 98.5 (16 May 2019 21:16)  HR: 64 (17 May 2019 04:29) (64 - 71)  BP: 93/57 (17 May 2019 04:29) (90/55 - 99/62)  BP(mean): --  RR: 16 (17 May 2019 04:29) (16 - 17)  SpO2: 98% (17 May 2019 04:29) (96% - 99%)    [PHYSICAL EXAM]  General: adult in NAD,  WN,  WD.  HEENT: clear oropharynx, anicteric sclera, pink conjunctivae.  Neck: supple, no masses.  CV: normal S1S2, no murmur, no rubs, no gallops.  Lungs: clear to auscultation, no wheezes, no rales, no rhonchi.  Abdomen: soft, non-tender, non-distended, no hepatosplenomegaly, normal BS, no guarding.  Ext: no clubbing, no cyanosis, no edema.  Skin: no rashes,  no petechiae, no venous stasis changes.  Neuro: alert and oriented X3, no focal motor deficits.  LN: no EFFIE.      [LABS:]                        8.2    2.96  )-----------( 157      ( 17 May 2019 08:14 )             25.0     05-17    142  |  108  |  31<H>  ----------------------------<  90  4.0   |  24  |  1.70<H>    Ca    8.1<L>      17 May 2019 08:14  Phos  3.0     05-16    TPro  6.5  /  Alb  3.3  /  TBili  0.8  /  DBili  x   /  AST  59<H>  /  ALT  48  /  AlkPhos  207<H>  05-15                [RADIOLOGY STUDIES:] [INTERVAL HX: ]  Patient seen and examined;  Chart reviewed and events noted;   states feels better.   appetite better  No CP, no SOB.   denies vomitting this AM  c/o BL LE edema    MEDICATIONS  (STANDING):  cefuroxime   Tablet 500 milliGRAM(s) Oral every 12 hours  dronabinol 2.5 milliGRAM(s) Oral two times a day  famotidine    Tablet 20 milliGRAM(s) Oral daily  heparin  Injectable 5000 Unit(s) SubCutaneous every 8 hours  lactobacillus acidophilus 1 Tablet(s) Oral three times a day with meals  potassium chloride    Tablet ER 10 milliEquivalent(s) Oral three times a day  sodium chloride 0.45% with potassium chloride 20 mEq/L 1000 milliLiter(s) (50 mL/Hr) IV Continuous <Continuous>    MEDICATIONS  (PRN):      Vital Signs Last 24 Hrs  T(C): 36.8 (17 May 2019 04:29), Max: 36.9 (16 May 2019 21:16)  T(F): 98.2 (17 May 2019 04:29), Max: 98.5 (16 May 2019 21:16)  HR: 64 (17 May 2019 04:29) (64 - 71)  BP: 93/57 (17 May 2019 04:29) (90/55 - 99/62)  BP(mean): --  RR: 16 (17 May 2019 04:29) (16 - 17)  SpO2: 98% (17 May 2019 04:29) (96% - 99%)    [PHYSICAL EXAM]  General: adult in NAD,  WN,  WD.  HEENT: clear oropharynx, anicteric sclera, pink conjunctivae.  Neck: supple, no masses.  CV: normal S1S2, no murmur, no rubs, no gallops.  Lungs: clear to auscultation, no wheezes, no rales, no rhonchi.  Abdomen: soft, non-tender, non-distended, no hepatosplenomegaly, normal BS, no guarding.  Ext: no clubbing, no cyanosis, no edema.  Skin: no rashes,  +scattered petechiae/purpura rash [stable], no venous stasis changes.  Neuro: alert and oriented X3, no focal motor deficits.  LN: no EFFIE.      [LABS:]                        8.2    2.96  )-----------( 157      ( 17 May 2019 08:14 )             25.0     05-17    142  |  108  |  31<H>  ----------------------------<  90  4.0   |  24  |  1.70<H>    Ca    8.1<L>      17 May 2019 08:14  Phos  3.0     05-16    TPro  6.5  /  Alb  3.3  /  TBili  0.8  /  DBili  x   /  AST  59<H>  /  ALT  48  /  AlkPhos  207<H>  05-15                [RADIOLOGY STUDIES:]

## 2019-05-17 NOTE — DISCHARGE NOTE PROVIDER - NSDCCPCAREPLAN_GEN_ALL_CORE_FT
PRINCIPAL DISCHARGE DIAGNOSIS  Diagnosis: TOM (acute kidney injury)  Assessment and Plan of Treatment: follow up with PMD      SECONDARY DISCHARGE DIAGNOSES  Diagnosis: Dehydration  Assessment and Plan of Treatment:     Diagnosis: Vomiting  Assessment and Plan of Treatment:

## 2019-05-17 NOTE — PROGRESS NOTE ADULT - PROBLEM SELECTOR PROBLEM 2
Ulcer of ankle, left, with unspecified severity
Bilious vomiting with nausea
Ulcer of ankle, left, with unspecified severity

## 2019-05-17 NOTE — PROGRESS NOTE ADULT - PROBLEM SELECTOR PLAN 1
IV Fluid   Nephro consult   hold nephrotoxic meds
have changed patient to PO abx and recommend:  Cefuroxime 500mg PO BID until 5/26

## 2019-05-17 NOTE — PROGRESS NOTE ADULT - PROBLEM SELECTOR PLAN 4
Tenormin
per medicine/oncology.    Thank you for consulting us and involving us in the management of this most interesting and challenging case.     Please Call with any further questions

## 2019-05-17 NOTE — PROGRESS NOTE ADULT - REASON FOR ADMISSION
Acute renal failure and Vomiting.

## 2019-05-17 NOTE — PROGRESS NOTE ADULT - SUBJECTIVE AND OBJECTIVE BOX
infectious diseases progress note:    JEEVAN LOONEY is a 68y y. o. Female patient    Patient reports: "no more nausea or vomiting and wanting to know when I can go home"    ROS:    EYES:  Negative  blurry vision or double vision  GASTROINTESTINAL:  Negative for nausea, vomiting, diarrhea  -otherwise negative except for subjective    Allergies    No Known Allergies    Intolerances        ANTIBIOTICS/RELEVANT:  antimicrobials  cefuroxime   Tablet 500 milliGRAM(s) Oral every 12 hours    immunologic:    OTHER:  dronabinol 2.5 milliGRAM(s) Oral two times a day  famotidine    Tablet 20 milliGRAM(s) Oral daily  heparin  Injectable 5000 Unit(s) SubCutaneous every 8 hours  lactobacillus acidophilus 1 Tablet(s) Oral three times a day with meals  potassium chloride    Tablet ER 10 milliEquivalent(s) Oral three times a day  sodium chloride 0.45% with potassium chloride 20 mEq/L 1000 milliLiter(s) IV Continuous <Continuous>      Objective:  Vital Signs Last 24 Hrs  T(C): 36.8 (17 May 2019 04:29), Max: 36.9 (16 May 2019 21:16)  T(F): 98.2 (17 May 2019 04:29), Max: 98.5 (16 May 2019 21:16)  HR: 64 (17 May 2019 04:29) (64 - 71)  BP: 93/57 (17 May 2019 04:29) (90/55 - 99/62)  BP(mean): --  RR: 16 (17 May 2019 04:29) (16 - 17)  SpO2: 98% (17 May 2019 04:29) (96% - 99%)    T(C): 36.8 (19 @ 04:29), Max: 37.3 (19 @ 05:00)  T(C): 36.8 (19 @ 04:29), Max: 37.3 (19 @ 05:00)  T(C): 36.8 (19 @ 04:29), Max: 37.3 (19 @ 05:00)    PHYSICAL EXAM:  Constitutional: Well-developed, well nourished  Eyes: PERRLA, EOMI  Ear/Nose/Throat: oropharynx normal	  Neck: no JVD, no lymphadenopathy, supple  Respiratory: no accessory muscle use  Cardiovascular: RRR,   Gastrointestinal: soft, NT  Extremities: no clubbing, no cyanosis, edema decreased, would wrapped      LABS:                        8.2    2.96  )-----------( 157      ( 17 May 2019 08:14 )             25.0       2.96  @ 08:14  3.51 16 @ 08:31  3.31 05-15 @ 18:29          142  |  108  |  31<H>  ----------------------------<  90  4.0   |  24  |  1.70<H>    Ca    8.1<L>      17 May 2019 08:14  Phos  3.0         TPro  6.5  /  Alb  3.3  /  TBili  0.8  /  DBili  x   /  AST  59<H>  /  ALT  48  /  AlkPhos  207<H>  05-15      Creatinine, Serum: 1.70 mg/dL (19 @ 08:14)  Creatinine, Serum: 1.90 mg/dL (19 @ 12:43)  Creatinine, Serum: 2.30 mg/dL (05-15-19 @ 18:29)        Urinalysis Basic - ( 15 May 2019 21:24 )    Color: Yellow / Appearance: Clear / S.020 / pH: x  Gluc: x / Ketone: Trace  / Bili: Negative / Urobili: Negative   Blood: x / Protein: 75 mg/dL / Nitrite: Negative   Leuk Esterase: Small / RBC: 0-2 /HPF / WBC 3-5   Sq Epi: x / Non Sq Epi: Few / Bacteria: Moderate            MICROBIOLOGY:              RADIOLOGY & ADDITIONAL STUDIES:

## 2019-05-17 NOTE — DISCHARGE NOTE PROVIDER - HOSPITAL COURSE
admitted for TOM    diuretics held    nausea / vomiting improved    anorexia - improved with marinol    local woundcare for LLE wound    Dc after renal and woundcare clearance

## 2019-05-17 NOTE — PROGRESS NOTE ADULT - SUBJECTIVE AND OBJECTIVE BOX
Patient is a 68y old  Female who presents with a chief complaint of      HPI: 67 yo Female with a PMH of sarcoma of liver, lung nodules, HTN, and CKD Stage 3 referred by oncologist, Dr. Hernández for evaluation. Pt c/o vomiting x 2 days. Pt states that she has had intermittent vomiting for a few weeks since she was admitted over a week ago at [facility] for L lower leg wound, dc home last week on cipro and amoxicillin.  States that she was able to keep down some fluids earlier. Pt states that she had mild erythema to L posterior lower leg, had US of BLE at [facility] with CT of chest on Saturday which was negative. Pt states that she has associated generalized weakness. The patient has been complaining of nausea and vomiting with poor intake for the past few days. She has been taking HCTZ daily as well. Pt denies fever, diarrhea, abdominal pain, CP, SOB. Renal consulted for elevated BUN/Cr. Patient follows with Nephrology for [facility]. Has a history of a partial left nephrectomy.     Follow up TOM/CKD       PAST MEDICAL & SURGICAL HISTORY:  Sarcoma: liver  Cancer of leg, right  Kidney stone  Hypertension  Renal calculi  Hypertension  S/P dilatation and curettage: for polyps  Cancer of leg, right: excision of tumor and txted with radiaton therapy  S/P cholecystectomy: appendectomy  History of nephrectomy  S/P arthroscopic surgery of left knee:   S/P tubal ligation  S/P appendectomy: cholecystectomy -   H/O partial nephrectomy: left -   S/P D&C: 2012  S/P  Section: 1968       FAMILY HISTORY:  Family history of breast cancer in sister (Sibling)  Family history of hypertension in mother  Family history of stroke: father  at 70 yr old  NC    Social History:Non smoker    MEDICATIONS  (STANDING):  ATENolol  Tablet 50 milliGRAM(s) Oral daily  cefTRIAXone   IVPB 1 Gram(s) IV Intermittent every 24 hours  heparin  Injectable 5000 Unit(s) SubCutaneous every 12 hours  sodium chloride 0.45%. 1000 milliLiter(s) (75 mL/Hr) IV Continuous <Continuous>    MEDICATIONS  (PRN):   Meds reviewed    Allergies    No Known Allergies    Intolerances         REVIEW OF SYSTEMS:    CONSTITUTIONAL: Poor intake  EYES: No eye pain, visual disturbances, or discharge  ENMT:  No difficulty hearing, tinnitus, vertigo; No sinus or throat pain  NECK: No pain or stiffness  BREASTS: No pain, masses, or nipple discharge  RESPIRATORY: No SOB. No wheeze. No WADE  CARDIOVASCULAR: No chest pain, palpitations, dizziness,   GASTROINTESTINAL: +Nausea, +Vomiting, No abdominal or epigastric pain. No hematemesis; No diarrhea or constipation. No melena   GENITOURINARY: No dysuria, frequency, hematuria, or incontinence  NEUROLOGICAL: No headaches, memory loss, loss of strength, numbness, or tremors  SKIN: No Diffuse erythema, no blisters  LYMPH NODES: No enlarged glands  ENDOCRINE: No heat or cold intolerance; No hair loss  MUSCULOSKELETAL: No joint pain or swelling   PSYCHIATRIC: No depression, anxiety, mood swings, or difficulty sleeping  HEME/LYMPH: No easy bruising, or bleeding gums  ALLERGY AND IMMUNOLOGIC: No hives or eczema      Vital Signs Last 24 Hrs  T(C): 37.3 (16 May 2019 05:00), Max: 37.3 (16 May 2019 05:00)  T(F): 99.1 (16 May 2019 05:00), Max: 99.1 (16 May 2019 05:00)  HR: 66 (16 May 2019 05:00) (66 - 107)  BP: 101/57 (16 May 2019 05:00) (98/63 - 121/72)  BP(mean): --  RR: 96 (16 May 2019 05:00) (17 - 96)  SpO2: 96% (16 May 2019 05:00) (96% - 100%)  Daily     Daily     PHYSICAL EXAM:    GENERAL: No distress  HEAD:  Atraumatic, Normocephalic  EYES: Conjunctiva and sclera clear  ENMT: slightly dry oral mucosa; parched lips  NECK: Supple  NERVOUS SYSTEM:  Alert & Oriented X3, Good concentration  CHEST/LUNG: Clear to percussion bilaterally; No rales, rhonchi, wheezing, or rubs  HEART: Regular rate and rhythm; No murmurs, rubs, or gallops  ABDOMEN: Soft, Nontender, Nondistended; Bowel sounds present  EXTREMITIES: +dressing over wound noted to L lower leg, +swelling noted to B feet, +mild BLE swelling noted  LYMPH: No lymphadenopathy noted  SKIN: No rashes or lesions      LABS:  Reviewed

## 2019-05-17 NOTE — CONSULT NOTE ADULT - SUBJECTIVE AND OBJECTIVE BOX
Attending:    Patient is a 68y old  Female who presents with a chief complaint of Acute renal failure and Vomiting. (16 May 2019 16:17)    to eval left lower leg wound       HPI:  JEEVAN LOONEY is a 67 YO Female brought to ER because of vomiting for 3 days.  According to the patient she was admitted to [facility] with left ankle wound treatment and discharged few days ago on PO Cipro and Amoxicillin.  She had vomiting on and off for a few weeks and usually resolves by itself.  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  Lately she diagnosed liver, lung nodules/mets.  Patient referred by oncologist, Dr. Hernández for evaluation.  She states that she was able to keep down some fluids earlier. Pt states that she had mild erythema to L posterior lower leg, had US of BLE at [facility] with CT of chest on Saturday which were negative. Pt states that she has associated generalized weakness. On further evaluation patient has acute renal failure possible secondary to dehydration and diuretics. (16 May 2019 05:43)      she has a long hx of non healing stasis ulcer left lower leg.  mri has been negative for OM    PAST MEDICAL & SURGICAL HISTORY:  Sarcoma: liver  Cancer of leg, right  Kidney stone  Hypertension  Renal calculi  Hypertension  S/P dilatation and curettage: for polyps  Cancer of leg, right: excision of tumor and txted with radiaton therapy  S/P cholecystectomy: appendectomy  History of nephrectomy  S/P arthroscopic surgery of left knee:   S/P tubal ligation  S/P appendectomy: cholecystectomy -   H/O partial nephrectomy: left -   S/P D&C: 2012  S/P  Section: 1968      ROS: Negative except for HPI    MEDICATIONS:  heparin  Injectable 5000 Unit(s) SubCutaneous every 8 hours      ATENolol  Tablet 25 milliGRAM(s) Oral daily      cefTRIAXone   IVPB 1 Gram(s) IV Intermittent every 24 hours  dronabinol 2.5 milliGRAM(s) Oral two times a day  famotidine    Tablet 20 milliGRAM(s) Oral daily  lactobacillus acidophilus 1 Tablet(s) Oral three times a day with meals  potassium chloride    Tablet ER 10 milliEquivalent(s) Oral three times a day  sodium chloride 0.45% with potassium chloride 20 mEq/L 1000 milliLiter(s) IV Continuous <Continuous>      Allergies    No Known Allergies    Intolerances        SOCIAL HISTORY: Denies tobacco, social ETOH, denies illicit drug use    FAMILY HISTORY:  Family history of breast cancer in sister (Sibling)  Family history of hypertension in mother  Family history of stroke: father  at 70 yr old      Vital Signs Last 24 Hrs  T(C): 36.8 (17 May 2019 04:29), Max: 36.9 (16 May 2019 21:16)  T(F): 98.2 (17 May 2019 04:29), Max: 98.5 (16 May 2019 21:16)  HR: 64 (17 May 2019 04:29) (64 - 71)  BP: 93/57 (17 May 2019 04:29) (90/55 - 99/62)  BP(mean): --  RR: 16 (17 May 2019 04:29) (16 - 17)  SpO2: 98% (17 May 2019 04:29) (96% - 99%)    PHYSICAL EXAM:    Constitutional: NAD     left lateral lower leg with full thickness open are to deep muscle with slough at base     LABS:                        8.1    3.51  )-----------( 211      ( 16 May 2019 08:31 )             24.7     05-16    142  |  108  |  32<H>  ----------------------------<  79  3.3<L>   |  22  |  1.90<H>    Ca    8.1<L>      16 May 2019 12:43  Phos  3.0     -16    TPro  6.5  /  Alb  3.3  /  TBili  0.8  /  DBili  x   /  AST  59<H>  /  ALT  48  /  AlkPhos  207<H>  15      Urinalysis Basic - ( 15 May 2019 21:24 )    Color: Yellow / Appearance: Clear / S.020 / pH: x  Gluc: x / Ketone: Trace  / Bili: Negative / Urobili: Negative   Blood: x / Protein: 75 mg/dL / Nitrite: Negative   Leuk Esterase: Small / RBC: 0-2 /HPF / WBC 3-5   Sq Epi: x / Non Sq Epi: Few / Bacteria: Moderate          RADIOLOGY & ADDITIONAL STUDIES:    ASSESSMENT/PLAN:  Patient is a 68y old  Female who presents with a chief complaint of Acute renal failure and Vomiting. (16 May 2019 16:17)        will order topical rx   -  -  -  -

## 2019-05-17 NOTE — PROGRESS NOTE ADULT - ASSESSMENT
Acute on CKD Stage 3  Dehydration  Nausea/Vomiting  LE Wound  Liver Sarcoma on chemo  HTN    -TOM is likely pre-renal in the setting of dehydration and use of HCTZ. TOM is now improving   -Agree with IVF as ordered  -Urine reviewed. No significant proteinuria. No urine eosinophil. FeUrea does not suggest pure pre-renal.   -Renal US showed mild right hydro   -ABx; ID eval  -Avoid HCTZ; continue with atenolol as ordered  -Conservative renal management   -Monitor chemistries    Thank you Acute on CKD Stage 3  Dehydration  Nausea/Vomiting  LE Wound  Liver Sarcoma on chemo  HTN    -TOM is likely pre-renal in the setting of dehydration and use of HCTZ. TOM is now improving   -Agree with IVF as ordered  -Urine reviewed. No significant proteinuria. No urine eosinophil. FeUrea does not suggest pure pre-renal.   -Renal US showed mild right hydro   -Conservative renal management   -Patient will need diuretics outpatient for LE edema. Suggest lasix 20 mg po QOD to start with. This can be start as outpatient     Thank you

## 2019-05-17 NOTE — PROGRESS NOTE ADULT - ASSESSMENT
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    Likely component of anticipatory nausea    TOM, Cr better.     LE edema fom IVF    Hypokalemia    Recommendations  1.  hold sutent. Cont Marinol PRN nausea. Can consider Xanax 0.5gm PRN nausea if not controlled.   2.  abx as per ID, wound care per surgery  3.  follow CBC, DVT prophylaxis. Electrolyte repalcement.   4.  further heme recommendations pending above. DC planning

## 2019-05-17 NOTE — DISCHARGE NOTE PROVIDER - CARE PROVIDER_API CALL
Amico, Frank J (DO)  Internal Medicine  [address], [address]  [place] [address]  Phone: [phone]  Fax: [phone]  Follow Up Time:     Jose Najera)  Surgery  [address] [place] [address], [address]  [place] [address]  Phone: [phone]  Fax: [phone]  Follow Up Time:

## 2019-05-17 NOTE — DISCHARGE NOTE NURSING/CASE MANAGEMENT/SOCIAL WORK - NSDCDPATPORTLINK_GEN_ALL_CORE
You can access the [facility] Patient Portal, offered by [facility], by registering with the following website: [email or web address]

## 2019-06-01 ENCOUNTER — OUTPATIENT (OUTPATIENT)
Dept: OUTPATIENT SERVICES | Facility: HOSPITAL | Age: 69
LOS: 1 days | End: 2019-06-01
Payer: MEDICARE

## 2019-06-01 DIAGNOSIS — Z90.5 ACQUIRED ABSENCE OF KIDNEY: Chronic | ICD-10-CM

## 2019-06-01 DIAGNOSIS — Z98.89 OTHER SPECIFIED POSTPROCEDURAL STATES: Chronic | ICD-10-CM

## 2019-06-01 DIAGNOSIS — C76.51 MALIGNANT NEOPLASM OF RIGHT LOWER LIMB: Chronic | ICD-10-CM

## 2019-06-01 DIAGNOSIS — Z90.49 ACQUIRED ABSENCE OF OTHER SPECIFIED PARTS OF DIGESTIVE TRACT: Chronic | ICD-10-CM

## 2019-06-01 PROCEDURE — G9001: CPT

## 2019-06-03 PROBLEM — C49.9 MALIGNANT NEOPLASM OF CONNECTIVE AND SOFT TISSUE, UNSPECIFIED: Chronic | Status: ACTIVE | Noted: 2019-05-15

## 2019-06-12 DIAGNOSIS — Z71.89 OTHER SPECIFIED COUNSELING: ICD-10-CM

## 2019-07-01 ENCOUNTER — APPOINTMENT (OUTPATIENT)
Dept: UROLOGY | Facility: CLINIC | Age: 69
End: 2019-07-01

## 2019-08-14 ENCOUNTER — APPOINTMENT (OUTPATIENT)
Dept: UROLOGY | Facility: CLINIC | Age: 69
End: 2019-08-14
Payer: MEDICARE

## 2019-08-14 ENCOUNTER — OUTPATIENT (OUTPATIENT)
Dept: OUTPATIENT SERVICES | Facility: HOSPITAL | Age: 69
LOS: 1 days | End: 2019-08-14
Payer: MEDICARE

## 2019-08-14 VITALS
TEMPERATURE: 98 F | HEIGHT: 66 IN | HEART RATE: 91 BPM | DIASTOLIC BLOOD PRESSURE: 76 MMHG | SYSTOLIC BLOOD PRESSURE: 128 MMHG | RESPIRATION RATE: 17 BRPM | WEIGHT: 170 LBS | BODY MASS INDEX: 27.32 KG/M2

## 2019-08-14 DIAGNOSIS — R35.0 FREQUENCY OF MICTURITION: ICD-10-CM

## 2019-08-14 DIAGNOSIS — Z98.89 OTHER SPECIFIED POSTPROCEDURAL STATES: Chronic | ICD-10-CM

## 2019-08-14 DIAGNOSIS — Z90.49 ACQUIRED ABSENCE OF OTHER SPECIFIED PARTS OF DIGESTIVE TRACT: Chronic | ICD-10-CM

## 2019-08-14 DIAGNOSIS — Z90.5 ACQUIRED ABSENCE OF KIDNEY: Chronic | ICD-10-CM

## 2019-08-14 DIAGNOSIS — C76.51 MALIGNANT NEOPLASM OF RIGHT LOWER LIMB: Chronic | ICD-10-CM

## 2019-08-14 PROCEDURE — 99213 OFFICE O/P EST LOW 20 MIN: CPT | Mod: 25

## 2019-08-14 PROCEDURE — 76775 US EXAM ABDO BACK WALL LIM: CPT | Mod: 26

## 2019-08-14 PROCEDURE — 76775 US EXAM ABDO BACK WALL LIM: CPT

## 2019-08-14 NOTE — ASSESSMENT
[FreeTextEntry1] : Continue increase fluids intake, lower salt intake, lower animal protein intake, increase citrus fruits and juices.\par f/u 6 months\par renal sono next visit

## 2019-08-14 NOTE — PHYSICAL EXAM
[General Appearance - Well Developed] : well developed [General Appearance - Well Nourished] : well nourished [Normal Appearance] : normal appearance [Well Groomed] : well groomed [General Appearance - In No Acute Distress] : no acute distress [Abdomen Soft] : soft [Abdomen Tenderness] : non-tender [Abdomen Mass (___ Cm)] : no abdominal mass palpated [Abdomen Hernia] : no hernia was discovered [Costovertebral Angle Tenderness] : no ~M costovertebral angle tenderness [Skin Color & Pigmentation] : normal skin color and pigmentation [Edema] : no peripheral edema [] : no respiratory distress [Respiration, Rhythm And Depth] : normal respiratory rhythm and effort [Exaggerated Use Of Accessory Muscles For Inspiration] : no accessory muscle use [Oriented To Time, Place, And Person] : oriented to person, place, and time [Affect] : the affect was normal [Not Anxious] : not anxious [Mood] : the mood was normal [Normal Station and Gait] : the gait and station were normal for the patient's age

## 2019-08-14 NOTE — HISTORY OF PRESENT ILLNESS
[None] : None [FreeTextEntry1] : See notes from last visit\par \par CT scan ([place] ): no renal stones and no hydronephrosis.\par Renal sono (today): no stones, no hydronephrosis.\par \par Feeling better and responding to Sutent for sarcoma, but having diffuse skin rashes and poor wound healing of left lower ext ulcer and bilateral LED edema.\par

## 2019-08-26 DIAGNOSIS — N20.0 CALCULUS OF KIDNEY: ICD-10-CM

## 2019-12-31 ENCOUNTER — INPATIENT (INPATIENT)
Facility: HOSPITAL | Age: 69
LOS: 2 days | Discharge: HOME | DRG: 682 | End: 2020-01-03
Attending: FAMILY MEDICINE | Admitting: FAMILY MEDICINE
Payer: MEDICARE

## 2019-12-31 VITALS
RESPIRATION RATE: 20 BRPM | DIASTOLIC BLOOD PRESSURE: 60 MMHG | HEIGHT: 66 IN | WEIGHT: 203.05 LBS | TEMPERATURE: 97 F | SYSTOLIC BLOOD PRESSURE: 98 MMHG | HEART RATE: 81 BPM | OXYGEN SATURATION: 97 %

## 2019-12-31 DIAGNOSIS — N17.9 ACUTE KIDNEY FAILURE, UNSPECIFIED: ICD-10-CM

## 2019-12-31 DIAGNOSIS — R06.02 SHORTNESS OF BREATH: ICD-10-CM

## 2019-12-31 DIAGNOSIS — C76.51 MALIGNANT NEOPLASM OF RIGHT LOWER LIMB: Chronic | ICD-10-CM

## 2019-12-31 DIAGNOSIS — D64.9 ANEMIA, UNSPECIFIED: ICD-10-CM

## 2019-12-31 DIAGNOSIS — Z98.89 OTHER SPECIFIED POSTPROCEDURAL STATES: Chronic | ICD-10-CM

## 2019-12-31 DIAGNOSIS — Z90.5 ACQUIRED ABSENCE OF KIDNEY: Chronic | ICD-10-CM

## 2019-12-31 DIAGNOSIS — Z90.49 ACQUIRED ABSENCE OF OTHER SPECIFIED PARTS OF DIGESTIVE TRACT: Chronic | ICD-10-CM

## 2019-12-31 LAB
ALBUMIN SERPL ELPH-MCNC: 2.9 G/DL — LOW (ref 3.3–5)
ALLERGY+IMMUNOLOGY DIAG STUDY NOTE: SIGNIFICANT CHANGE UP
ALP SERPL-CCNC: 959 U/L — HIGH (ref 40–120)
ALT FLD-CCNC: 81 U/L — HIGH (ref 12–78)
AMMONIA BLD-MCNC: 40 UMOL/L — HIGH (ref 11–32)
ANION GAP SERPL CALC-SCNC: 14 MMOL/L — SIGNIFICANT CHANGE UP (ref 5–17)
APTT BLD: 30.6 SEC — SIGNIFICANT CHANGE UP (ref 28.5–37)
AST SERPL-CCNC: 107 U/L — HIGH (ref 15–37)
BASOPHILS # BLD AUTO: 0 K/UL — SIGNIFICANT CHANGE UP (ref 0–0.2)
BASOPHILS NFR BLD AUTO: 0 % — SIGNIFICANT CHANGE UP (ref 0–2)
BILIRUB SERPL-MCNC: 3.4 MG/DL — HIGH (ref 0.2–1.2)
BLD GP AB SCN SERPL QL: SIGNIFICANT CHANGE UP
BUN SERPL-MCNC: 113 MG/DL — HIGH (ref 7–23)
CALCIUM SERPL-MCNC: 8.6 MG/DL — SIGNIFICANT CHANGE UP (ref 8.5–10.1)
CHLORIDE SERPL-SCNC: 107 MMOL/L — SIGNIFICANT CHANGE UP (ref 96–108)
CO2 SERPL-SCNC: 20 MMOL/L — LOW (ref 22–31)
CREAT SERPL-MCNC: 3.6 MG/DL — HIGH (ref 0.5–1.3)
DIR ANTIGLOB POLYSPECIFIC INTERPRETATION: SIGNIFICANT CHANGE UP
EOSINOPHIL # BLD AUTO: 0.08 K/UL — SIGNIFICANT CHANGE UP (ref 0–0.5)
EOSINOPHIL NFR BLD AUTO: 2 % — SIGNIFICANT CHANGE UP (ref 0–6)
GLUCOSE SERPL-MCNC: 48 MG/DL — LOW (ref 70–99)
HCT VFR BLD CALC: 21 % — CRITICAL LOW (ref 34.5–45)
HGB BLD-MCNC: 6.9 G/DL — CRITICAL LOW (ref 11.5–15.5)
INR BLD: 1.25 RATIO — HIGH (ref 0.88–1.16)
LYMPHOCYTES # BLD AUTO: 0.4 K/UL — LOW (ref 1–3.3)
LYMPHOCYTES # BLD AUTO: 10 % — LOW (ref 13–44)
MCHC RBC-ENTMCNC: 32.9 GM/DL — SIGNIFICANT CHANGE UP (ref 32–36)
MCHC RBC-ENTMCNC: 35.8 PG — HIGH (ref 27–34)
MCV RBC AUTO: 108.8 FL — HIGH (ref 80–100)
MONOCYTES # BLD AUTO: 0.68 K/UL — SIGNIFICANT CHANGE UP (ref 0–0.9)
MONOCYTES NFR BLD AUTO: 17 % — HIGH (ref 2–14)
NEUTROPHILS # BLD AUTO: 2.85 K/UL — SIGNIFICANT CHANGE UP (ref 1.8–7.4)
NEUTROPHILS NFR BLD AUTO: 64 % — SIGNIFICANT CHANGE UP (ref 43–77)
NRBC # BLD: SIGNIFICANT CHANGE UP /100 WBCS (ref 0–0)
PLATELET # BLD AUTO: 68 K/UL — LOW (ref 150–400)
POTASSIUM SERPL-MCNC: 3.8 MMOL/L — SIGNIFICANT CHANGE UP (ref 3.5–5.3)
POTASSIUM SERPL-SCNC: 3.8 MMOL/L — SIGNIFICANT CHANGE UP (ref 3.5–5.3)
PROT SERPL-MCNC: 6 G/DL — SIGNIFICANT CHANGE UP (ref 6–8.3)
PROTHROM AB SERPL-ACNC: 14.4 SEC — HIGH (ref 10–12.9)
RBC # BLD: 1.93 M/UL — LOW (ref 3.8–5.2)
RBC # FLD: 22.5 % — HIGH (ref 10.3–14.5)
SODIUM SERPL-SCNC: 141 MMOL/L — SIGNIFICANT CHANGE UP (ref 135–145)
WBC # BLD: 4.01 K/UL — SIGNIFICANT CHANGE UP (ref 3.8–10.5)
WBC # FLD AUTO: 4.01 K/UL — SIGNIFICANT CHANGE UP (ref 3.8–10.5)

## 2019-12-31 PROCEDURE — 71045 X-RAY EXAM CHEST 1 VIEW: CPT | Mod: 26

## 2019-12-31 PROCEDURE — 86077 PHYS BLOOD BANK SERV XMATCH: CPT

## 2019-12-31 PROCEDURE — 74176 CT ABD & PELVIS W/O CONTRAST: CPT | Mod: 26

## 2019-12-31 PROCEDURE — 76705 ECHO EXAM OF ABDOMEN: CPT | Mod: 26

## 2019-12-31 PROCEDURE — 99284 EMERGENCY DEPT VISIT MOD MDM: CPT

## 2019-12-31 PROCEDURE — 76700 US EXAM ABDOM COMPLETE: CPT | Mod: 26

## 2019-12-31 RX ORDER — DRONABINOL 2.5 MG
2.5 CAPSULE ORAL
Refills: 0 | Status: DISCONTINUED | OUTPATIENT
Start: 2019-12-31 | End: 2020-01-01

## 2019-12-31 RX ORDER — DRONABINOL 2.5 MG
0 CAPSULE ORAL
Qty: 0 | Refills: 0 | DISCHARGE

## 2019-12-31 RX ORDER — FUROSEMIDE 40 MG
20 TABLET ORAL THREE TIMES A DAY
Refills: 0 | Status: DISCONTINUED | OUTPATIENT
Start: 2019-12-31 | End: 2020-01-01

## 2019-12-31 RX ORDER — HEPARIN SODIUM 5000 [USP'U]/ML
5000 INJECTION INTRAVENOUS; SUBCUTANEOUS EVERY 12 HOURS
Refills: 0 | Status: DISCONTINUED | OUTPATIENT
Start: 2019-12-31 | End: 2020-01-01

## 2019-12-31 RX ORDER — FUROSEMIDE 40 MG
0 TABLET ORAL
Qty: 0 | Refills: 0 | DISCHARGE

## 2019-12-31 RX ORDER — ACETAMINOPHEN 500 MG
650 TABLET ORAL ONCE
Refills: 0 | Status: COMPLETED | OUTPATIENT
Start: 2019-12-31 | End: 2019-12-31

## 2019-12-31 RX ADMIN — Medication 650 MILLIGRAM(S): at 21:52

## 2019-12-31 RX ADMIN — Medication 20 MILLIGRAM(S): at 23:25

## 2019-12-31 RX ADMIN — HEPARIN SODIUM 5000 UNIT(S): 5000 INJECTION INTRAVENOUS; SUBCUTANEOUS at 18:59

## 2019-12-31 RX ADMIN — Medication 2.5 MILLIGRAM(S): at 19:00

## 2019-12-31 NOTE — H&P ADULT - NSICDXFAMILYHX_GEN_ALL_CORE_FT
FAMILY HISTORY:  Family history of hypertension in mother  Family history of stroke, father  at 70 yr old    Sibling  Still living? No  Family history of breast cancer in sister, Age at diagnosis: Age Unknown

## 2019-12-31 NOTE — H&P ADULT - HISTORY OF PRESENT ILLNESS
68 yo F PMHx liver CA, kidney disease, HTN presents to ED c/o generalized weakness and increasing shortness of breath x a few weeks. Pt states that she was admitted last week at [facility] for this issue but states nothing was done to improve symptoms. Denies recent illness, URI symptoms, fever/chills, chest pain, abd pain.  In ER patient was found to be anemic and in TOM.  patient is being admitted for further work up and treatment.

## 2019-12-31 NOTE — ED ADULT NURSE NOTE - PMH
Cancer of leg, right    Hypertension    Hypertension    Kidney stone    Liver cancer    Renal calculi    Sarcoma  liver

## 2019-12-31 NOTE — PROGRESS NOTE ADULT - SUBJECTIVE AND OBJECTIVE BOX
TOM on CKDIII. Cr was around 2.5 in [facility] a month ago. There were concerns of HRS. Full consul to follow. Urine studies ordered

## 2019-12-31 NOTE — H&P ADULT - NSICDXPASTMEDICALHX_GEN_ALL_CORE_FT
PAST MEDICAL HISTORY:  Cancer of leg, right     Hypertension     Hypertension     Kidney stone     Liver cancer     Renal calculi     Sarcoma liver

## 2019-12-31 NOTE — CONSULT NOTE ADULT - SUBJECTIVE AND OBJECTIVE BOX
[facility]  Mickey Nieto PA-C  [address] [place] [id number]  [phone]      Chief Complaint:  Patient is a 69y old  Female who presents with a chief complaint of sob (31 Dec 2019 17:14)      HPI: 69F with metastatic sarcoma who presents with shortness of breath, weakness and leg pain  she was recently at [facility] "nothing was done"  she sees carla harvey for her liver sarcoma, on a chemo pill but was recently stopped  no melena or hematochezia  stools has been brown    Allergies:  No Known Allergies      Medications:  dronabinol 2.5 milliGRAM(s) Oral two times a day  furosemide    Tablet 20 milliGRAM(s) Oral three times a day  heparin  Injectable 5000 Unit(s) SubCutaneous every 12 hours  metolazone 2.5 milliGRAM(s) Oral daily      PMHX/PSHX:  Liver cancer  Sarcoma  Cancer, liver, primary  Cancer of leg, right  Kidney stone  Hypertension  Renal calculi  Hypertension  S/P dilatation and curettage  Cancer of leg, right  S/P cholecystectomy  History of nephrectomy  S/P arthroscopic surgery of left knee  S/P tubal ligation  S/P appendectomy  H/O partial nephrectomy  S/P D&C  S/P  Section      Family history:  Family history of breast cancer in sister (Sibling)  Family history of hypertension in mother  Family history of stroke      Social History:     ROS:     General:  No wt loss, fevers, chills, night sweats, + fatigue,   Eyes:  Good vision, no reported pain  ENT:  No sore throat, pain, runny nose, dysphagia  CV:  No pain, palpitations, hypo/hypertension  Resp:  No dyspnea, cough, tachypnea, wheezing  GI:  No pain, No nausea, No vomiting, No diarrhea, No constipation, No weight loss, No fever, No pruritis, No rectal bleeding, No tarry stools, No dysphagia,  :  No pain, bleeding, incontinence, nocturia  Muscle:  No pain, weakness  Neuro:  No weakness, tingling, memory problems  Psych:  No fatigue, insomnia, mood problems, depression  Endocrine:  No polyuria, polydipsia, cold/heat intolerance  Heme:  No petechiae, ecchymosis, easy bruisability  Skin:  No rash, tattoos, scars, edema      PHYSICAL EXAM:   Vital Signs:  Vital Signs Last 24 Hrs  T(C): 36.4 (31 Dec 2019 19:01), Max: 36.4 (31 Dec 2019 19:01)  T(F): 97.5 (31 Dec 2019 19:01), Max: 97.5 (31 Dec 2019 19:01)  HR: 85 (31 Dec 2019 19:) (81 - 85)  BP: 108/61 (31 Dec 2019 19:) (98/60 - 108/61)  BP(mean): --  RR: 20 (31 Dec 2019 19:) (20 - 20)  SpO2: 96% (31 Dec 2019 19:) (96% - 97%)  Daily Height in cm: 167.64 (31 Dec 2019 12:10)    Daily     GENERAL:  Appears stated age, well-groomed, well-nourished, no distress  HEENT:  NC/AT,  conjunctivae clear and pink, no thyromegaly, nodules, adenopathy, no JVD, sclera -anicteric  CHEST:  Full & symmetric excursion, no increased effort, breath sounds clear  HEART:  Regular rhythm, S1, S2, no murmur/rub/S3/S4, no abdominal bruit, no edema  ABDOMEN:  Soft, non-tender, non-distended, normoactive bowel sounds,  no masses ,no hepato-splenomegaly, no signs of chronic liver disease  EXTEREMITIES:  no cyanosis,clubbing or edema  SKIN:  No rash/erythema/ecchymoses/petechiae/wounds/abscess/warm/dry  NEURO:  Alert, oriented, no asterixis, no tremor, no encephalopathy    LABS:                        6.9    4.01  )-----------( 68       ( 31 Dec 2019 13:34 )             21.0         141  |  107  |  113<H>  ----------------------------<  48<L>  3.8   |  20<L>  |  3.60<H>    Ca    8.6      31 Dec 2019 13:34    TPro  6.0  /  Alb  2.9<L>  /  TBili  3.4<H>  /  DBili  x   /  AST  107<H>  /  ALT  81<H>  /  AlkPhos  959<H>      LIVER FUNCTIONS - ( 31 Dec 2019 13:34 )  Alb: 2.9 g/dL / Pro: 6.0 g/dL / ALK PHOS: 959 U/L / ALT: 81 U/L / AST: 107 U/L / GGT: x           PT/INR - ( 31 Dec 2019 13:34 )   PT: 14.4 sec;   INR: 1.25 ratio         PTT - ( 31 Dec 2019 13:34 )  PTT:30.6 sec    Amylase Serum--      Lipase serum--       [id number]      Imaging:

## 2019-12-31 NOTE — ED ADULT TRIAGE NOTE - BMI (KG/M2)
49 yo F with triple positive (ER 20%, PR10%, Her2 2+ FISH amplified) breast cancer (dx 2012) initially stage IIIB, s/p L mastectomy followed by ACTH then 1 year of herceptin and RT, tamoxifen, with recurrence in 1/2019 (mediastinal and hilar LNs, small lung nodules, ?liver mets) on weekly paclitaxel (since 3/2019) and exemestane, HSV+ skin rash with MRSA superinfection p/w fever and chills, admitted for sepsis and symptomatic anemia     1. Metastatic breast cancer: triple positive when first diagnosed but ER+ UT neg Her 2 neg on recurre  - no plan for chemotherapy as inpatient  - repeat imaging to assess disease status as outpatient  plan d/w with son/patient    2l. Anemia / MDS / Elevated retic  -s/p EGD showing bleeding L gastric artery s/p EGD treatment then embolization w/ incomplete improvement  -d/w Dr. Hayden, agree with partial gastric resection of observed ulcer ? metastases vs stress  -Now s/p distal gastrectomy (0701/19) w/ BII reconstruction  -stable post surgery - await pathology  -appreciate surgical f/u.   -for UGI to evaluate BII function  -will see as outpatient on patient discharge. 32.8

## 2019-12-31 NOTE — CONSULT NOTE ADULT - ASSESSMENT
elevated lfts  anemia     anemic with no overt gi bleed  transfuse prn  recently occult negative  rapid rise in lfts  unclear if progression of disease  oncology eval pending   prognosis poor

## 2019-12-31 NOTE — H&P ADULT - NSHPLABSRESULTS_GEN_ALL_CORE
12-31    141  |  107  |  113<H>  ----------------------------<  48<L>  3.8   |  20<L>  |  3.60<H>    Ca    8.6      31 Dec 2019 13:34    TPro  6.0  /  Alb  2.9<L>  /  TBili  3.4<H>  /  DBili  x   /  AST  107<H>  /  ALT  81<H>  /  AlkPhos  959<H>  12-31                            6.9    4.01  )-----------( 68       ( 31 Dec 2019 13:34 )             21.0             LIVER FUNCTIONS - ( 31 Dec 2019 13:34 )  Alb: 2.9 g/dL / Pro: 6.0 g/dL / ALK PHOS: 959 U/L / ALT: 81 U/L / AST: 107 U/L / GGT: x             PT/INR - ( 31 Dec 2019 13:34 )   PT: 14.4 sec;   INR: 1.25 ratio         PTT - ( 31 Dec 2019 13:34 )  PTT:30.6 sec

## 2019-12-31 NOTE — ED ADULT NURSE NOTE - OBJECTIVE STATEMENT
pt c/o abd distention b/l lower extremity edema.  pt states she's "been to [facility] twice and they don't drain the fluid from my abdomen.  They just keep sending me home."  +abd distention, jaundice b/l lower extremity edema bruising noted throughout her body.  non healing wound above her left malleolus.  pt states she's had it for about a year.  states it started as a scratch, and was told she has decreased wound healing from her oral chemo meds.

## 2019-12-31 NOTE — ED PROVIDER NOTE - OBJECTIVE STATEMENT
68 yo F PMHx liver CA, kidney disease, HTN presents to ED c/o generalized weakness and increasing shortness of breath x a few weeks. Pt states that she was admitted last week at [facility] for this issue but states nothing was done to improve symptoms. Denies recent illness, URI symptoms, fever/chills, chest pain, abd pain.   Onc- Ledy Hernández  - Amico

## 2019-12-31 NOTE — H&P ADULT - NSHPPHYSICALEXAM_GEN_ALL_CORE
· CONSTITUTIONAL: Well appearing, awake, alert, oriented to person, place, time/situation and in no apparent distress.  · CARDIAC: Normal rate, regular rhythm.  Heart sounds S1, S2.  No murmurs, rubs or gallops.  · RESPIRATORY: Breath sounds clear and equal bilaterally.  · GASTROINTESTINAL: - - -  · Abdominal Exam: FIRM, DISTENDED  · Bowel Sounds: present x 4 quadrants  · SKIN: - - -  · SKIN COLOR: JAUNDICED  · SKIN TEMP: warm; dry  · SKIN CAP REFILL: less than 2 seconds

## 2019-12-31 NOTE — ED PROVIDER NOTE - ATTENDING CONTRIBUTION TO CARE
68 y/o F from home with increased weakness.  hx of mets.  low H/H.  no obvious signs of bleeding.  profound ascites.  admit for possible paracentesis, heme/onc, GI.

## 2019-12-31 NOTE — ED PROVIDER NOTE - PR
Health Maintenance Summary     Topic Due On Due Status Completed On Postpone Until Reason    Immunization - Td/Tdap Sep 19, 2021 Not Due Sep 19, 2011      Immunization-Influenza Sep 1, 2016 Postponed  Apr 1, 2017 Patient Refused          Patient is up to date, no discussion needed .       138

## 2019-12-31 NOTE — ED ADULT NURSE NOTE - NSIMPLEMENTINTERV_GEN_ALL_ED
Implemented All Universal Safety Interventions:  [place] to call system. Call bell, personal items and telephone within reach. Instruct patient to call for assistance. Room bathroom lighting operational. Non-slip footwear when patient is off stretcher. Physically safe environment: no spills, clutter or unnecessary equipment. Stretcher in lowest position, wheels locked, appropriate side rails in place.

## 2020-01-01 DIAGNOSIS — C49.9 MALIGNANT NEOPLASM OF CONNECTIVE AND SOFT TISSUE, UNSPECIFIED: ICD-10-CM

## 2020-01-01 DIAGNOSIS — R33.9 RETENTION OF URINE, UNSPECIFIED: ICD-10-CM

## 2020-01-01 LAB
ALBUMIN SERPL ELPH-MCNC: 2.8 G/DL — LOW (ref 3.3–5)
ALP SERPL-CCNC: 929 U/L — HIGH (ref 40–120)
ALT FLD-CCNC: 75 U/L — SIGNIFICANT CHANGE UP (ref 12–78)
ANION GAP SERPL CALC-SCNC: 17 MMOL/L — SIGNIFICANT CHANGE UP (ref 5–17)
AST SERPL-CCNC: 89 U/L — HIGH (ref 15–37)
BILIRUB SERPL-MCNC: 4.4 MG/DL — HIGH (ref 0.2–1.2)
BUN SERPL-MCNC: 112 MG/DL — HIGH (ref 7–23)
CALCIUM SERPL-MCNC: 8.3 MG/DL — LOW (ref 8.5–10.1)
CHLORIDE SERPL-SCNC: 106 MMOL/L — SIGNIFICANT CHANGE UP (ref 96–108)
CO2 SERPL-SCNC: 18 MMOL/L — LOW (ref 22–31)
CREAT ?TM UR-MCNC: 75 MG/DL — SIGNIFICANT CHANGE UP
CREAT SERPL-MCNC: 3.6 MG/DL — HIGH (ref 0.5–1.3)
EOSINOPHIL NFR URNS MANUAL: NEGATIVE — SIGNIFICANT CHANGE UP
FERRITIN SERPL-MCNC: 1459 NG/ML — HIGH (ref 15–150)
GLUCOSE SERPL-MCNC: 63 MG/DL — LOW (ref 70–99)
HCT VFR BLD CALC: 21.7 % — LOW (ref 34.5–45)
HCT VFR BLD CALC: 22.7 % — LOW (ref 34.5–45)
HGB BLD-MCNC: 7.3 G/DL — LOW (ref 11.5–15.5)
HGB BLD-MCNC: 7.8 G/DL — LOW (ref 11.5–15.5)
IRON SATN MFR SERPL: 134 UG/DL — SIGNIFICANT CHANGE UP (ref 30–160)
IRON SATN MFR SERPL: SIGNIFICANT CHANGE UP % (ref 14–50)
MAGNESIUM SERPL-MCNC: 3 MG/DL — HIGH (ref 1.6–2.6)
MCHC RBC-ENTMCNC: 33.6 GM/DL — SIGNIFICANT CHANGE UP (ref 32–36)
MCHC RBC-ENTMCNC: 35.1 PG — HIGH (ref 27–34)
MCV RBC AUTO: 104.3 FL — HIGH (ref 80–100)
NRBC # BLD: 0 /100 WBCS — SIGNIFICANT CHANGE UP (ref 0–0)
OB PNL STL: POSITIVE
OSMOLALITY UR: 328 MOSM/KG — SIGNIFICANT CHANGE UP (ref 50–1200)
PHOSPHATE SERPL-MCNC: 6.1 MG/DL — HIGH (ref 2.5–4.5)
PLATELET # BLD AUTO: 64 K/UL — LOW (ref 150–400)
POTASSIUM SERPL-MCNC: 3.3 MMOL/L — LOW (ref 3.5–5.3)
POTASSIUM SERPL-SCNC: 3.3 MMOL/L — LOW (ref 3.5–5.3)
POTASSIUM UR-SCNC: 59.8 MMOL/L — SIGNIFICANT CHANGE UP
PROT ?TM UR-MCNC: 154 MG/DL — HIGH (ref 0–12)
PROT SERPL-MCNC: 5.8 G/DL — LOW (ref 6–8.3)
RBC # BLD: 2.08 M/UL — LOW (ref 3.8–5.2)
RBC # FLD: 24.2 % — HIGH (ref 10.3–14.5)
SODIUM SERPL-SCNC: 141 MMOL/L — SIGNIFICANT CHANGE UP (ref 135–145)
SODIUM UR-SCNC: 28 MMOL/L — SIGNIFICANT CHANGE UP
TIBC SERPL-MCNC: SIGNIFICANT CHANGE UP UG/DL (ref 220–430)
UIBC SERPL-MCNC: <20 UG/DL — LOW (ref 110–370)
URATE SERPL-MCNC: 15.4 MG/DL — HIGH (ref 2.5–7)
UUN UR-MCNC: 375 MG/DL — SIGNIFICANT CHANGE UP
WBC # BLD: 4.29 K/UL — SIGNIFICANT CHANGE UP (ref 3.8–10.5)
WBC # FLD AUTO: 4.29 K/UL — SIGNIFICANT CHANGE UP (ref 3.8–10.5)

## 2020-01-01 PROCEDURE — 93970 EXTREMITY STUDY: CPT | Mod: 26

## 2020-01-01 RX ORDER — SENNA PLUS 8.6 MG/1
2 TABLET ORAL AT BEDTIME
Refills: 0 | Status: DISCONTINUED | OUTPATIENT
Start: 2020-01-01 | End: 2020-01-03

## 2020-01-01 RX ORDER — SODIUM BICARBONATE 1 MEQ/ML
650 SYRINGE (ML) INTRAVENOUS THREE TIMES A DAY
Refills: 0 | Status: DISCONTINUED | OUTPATIENT
Start: 2020-01-01 | End: 2020-01-03

## 2020-01-01 RX ORDER — MORPHINE SULFATE 50 MG/1
2 CAPSULE, EXTENDED RELEASE ORAL EVERY 6 HOURS
Refills: 0 | Status: DISCONTINUED | OUTPATIENT
Start: 2020-01-01 | End: 2020-01-03

## 2020-01-01 RX ORDER — POTASSIUM CHLORIDE 20 MEQ
20 PACKET (EA) ORAL
Refills: 0 | Status: COMPLETED | OUTPATIENT
Start: 2020-01-01 | End: 2020-01-01

## 2020-01-01 RX ORDER — MIRTAZAPINE 45 MG/1
7.5 TABLET, ORALLY DISINTEGRATING ORAL AT BEDTIME
Refills: 0 | Status: DISCONTINUED | OUTPATIENT
Start: 2020-01-01 | End: 2020-01-03

## 2020-01-01 RX ORDER — TAMSULOSIN HYDROCHLORIDE 0.4 MG/1
0.4 CAPSULE ORAL AT BEDTIME
Refills: 0 | Status: DISCONTINUED | OUTPATIENT
Start: 2020-01-01 | End: 2020-01-03

## 2020-01-01 RX ORDER — OXYCODONE HYDROCHLORIDE 5 MG/1
5 TABLET ORAL
Refills: 0 | Status: DISCONTINUED | OUTPATIENT
Start: 2020-01-01 | End: 2020-01-03

## 2020-01-01 RX ORDER — MIDODRINE HYDROCHLORIDE 2.5 MG/1
5 TABLET ORAL THREE TIMES A DAY
Refills: 0 | Status: DISCONTINUED | OUTPATIENT
Start: 2020-01-01 | End: 2020-01-03

## 2020-01-01 RX ORDER — FUROSEMIDE 40 MG
20 TABLET ORAL EVERY 6 HOURS
Refills: 0 | Status: DISCONTINUED | OUTPATIENT
Start: 2020-01-01 | End: 2020-01-03

## 2020-01-01 RX ORDER — TRAMADOL HYDROCHLORIDE 50 MG/1
25 TABLET ORAL
Refills: 0 | Status: DISCONTINUED | OUTPATIENT
Start: 2020-01-01 | End: 2020-01-03

## 2020-01-01 RX ORDER — ALBUMIN HUMAN 25 %
50 VIAL (ML) INTRAVENOUS EVERY 12 HOURS
Refills: 0 | Status: COMPLETED | OUTPATIENT
Start: 2020-01-01 | End: 2020-01-03

## 2020-01-01 RX ORDER — GABAPENTIN 400 MG/1
100 CAPSULE ORAL THREE TIMES A DAY
Refills: 0 | Status: DISCONTINUED | OUTPATIENT
Start: 2020-01-01 | End: 2020-01-03

## 2020-01-01 RX ORDER — DRONABINOL 2.5 MG
5 CAPSULE ORAL
Refills: 0 | Status: DISCONTINUED | OUTPATIENT
Start: 2020-01-01 | End: 2020-01-03

## 2020-01-01 RX ADMIN — TRAMADOL HYDROCHLORIDE 25 MILLIGRAM(S): 50 TABLET ORAL at 13:42

## 2020-01-01 RX ADMIN — GABAPENTIN 100 MILLIGRAM(S): 400 CAPSULE ORAL at 22:36

## 2020-01-01 RX ADMIN — Medication 2.5 MILLIGRAM(S): at 06:03

## 2020-01-01 RX ADMIN — TAMSULOSIN HYDROCHLORIDE 0.4 MILLIGRAM(S): 0.4 CAPSULE ORAL at 22:36

## 2020-01-01 RX ADMIN — Medication 20 MILLIEQUIVALENT(S): at 11:22

## 2020-01-01 RX ADMIN — HEPARIN SODIUM 5000 UNIT(S): 5000 INJECTION INTRAVENOUS; SUBCUTANEOUS at 06:04

## 2020-01-01 RX ADMIN — MIRTAZAPINE 7.5 MILLIGRAM(S): 45 TABLET, ORALLY DISINTEGRATING ORAL at 22:36

## 2020-01-01 RX ADMIN — Medication 20 MILLIEQUIVALENT(S): at 13:42

## 2020-01-01 RX ADMIN — TRAMADOL HYDROCHLORIDE 25 MILLIGRAM(S): 50 TABLET ORAL at 14:40

## 2020-01-01 RX ADMIN — GABAPENTIN 100 MILLIGRAM(S): 400 CAPSULE ORAL at 14:47

## 2020-01-01 RX ADMIN — Medication 20 MILLIGRAM(S): at 14:47

## 2020-01-01 RX ADMIN — MIDODRINE HYDROCHLORIDE 5 MILLIGRAM(S): 2.5 TABLET ORAL at 22:36

## 2020-01-01 RX ADMIN — SENNA PLUS 2 TABLET(S): 8.6 TABLET ORAL at 22:36

## 2020-01-01 RX ADMIN — Medication 650 MILLIGRAM(S): at 22:36

## 2020-01-01 RX ADMIN — Medication 5 MILLIGRAM(S): at 17:37

## 2020-01-01 RX ADMIN — Medication 50 MILLILITER(S): at 17:37

## 2020-01-01 RX ADMIN — Medication 20 MILLIGRAM(S): at 06:04

## 2020-01-01 RX ADMIN — Medication 650 MILLIGRAM(S): at 14:47

## 2020-01-01 RX ADMIN — Medication 20 MILLIEQUIVALENT(S): at 17:37

## 2020-01-01 NOTE — CONSULT NOTE ADULT - ASSESSMENT
TOM/CKDIII  Metabolic acidosis  Hypokalemia  Anemia  Metastatic disease   SOB    - TOM on CKDIII. CKDIII baseline with Cr around 2  - TOM with possible HRS. HRS is a diagnosed of exclusion. Urine studies. IV albumin  - May need to add midodrine + octreotide   - Will need RRT if renal function continues to worsen, but needs oncology's input regarding overall prognosis   - Can continue Lasix. I would consider stopping Zaroxolyn and add Aldactone   - Echo pending  - Bladder scan   - Oral bicarb   - KCl  - Anemia per Heme/Onc and GI    Thank you for involving nephrology with the care of this patient

## 2020-01-01 NOTE — PROGRESS NOTE ADULT - ASSESSMENT
elevated lfts  anemia     imaging reviewed- diffuse liver mets  anemic with no overt gi bleed  recently occult negative  monitor cbc, transfuse prn  rapid rise in lfts, unclear if progression of disease  f/u Elli; renal following   trend lfts qd, avoid hepatotoxins  f/u further heme/onc recs  diet as tolerated  prognosis poor

## 2020-01-01 NOTE — CONSULT NOTE ADULT - SUBJECTIVE AND OBJECTIVE BOX
Patient is a 69y old  Female who presents with a chief complaint of sob (2020 07:59)       HPI:  70 yo F PMHx liver CA, kidney disease, HTN presents to ED c/o generalized weakness and increasing shortness of breath x a few weeks. Pt states that she was admitted last week at [facility] for this issue but states nothing was done to improve symptoms. Denies recent illness, URI symptoms, fever/chills, chest pain, abd pain.    In ER patient was found to be anemic and in TOM. Renal is being consulted for such. CKDIII baseline with Cr around 2. No other c/o.       PAST MEDICAL & SURGICAL HISTORY:  Liver cancer  Sarcoma: liver  Cancer of leg, right  Kidney stone  Hypertension  Renal calculi  Hypertension  S/P dilatation and curettage: for polyps  Cancer of leg, right: excision of tumor and txted with radiaton therapy  S/P cholecystectomy: appendectomy  History of nephrectomy  S/P arthroscopic surgery of left knee:   S/P tubal ligation  S/P appendectomy: cholecystectomy -   H/O partial nephrectomy: left -   S/P D&C: 2012  S/P  Section: 1968       FAMILY HISTORY:  Family history of breast cancer in sister (Sibling)  Family history of hypertension in mother  Family history of stroke: father  at 70 yr old  [place]    Social History:Non smoker    MEDICATIONS  (STANDING):  dronabinol 2.5 milliGRAM(s) Oral two times a day  furosemide    Tablet 20 milliGRAM(s) Oral three times a day  metolazone 2.5 milliGRAM(s) Oral daily  potassium chloride    Tablet ER 20 milliEquivalent(s) Oral every 2 hours    MEDICATIONS  (PRN):   Meds reviewed    Allergies    No Known Allergies    Intolerances         REVIEW OF SYSTEMS:    CONSTITUTIONAL:  No weight loss   EYES: No eye pain, visual disturbances, or discharge  ENMT:  No difficulty hearing, tinnitus, vertigo; No sinus or throat pain  NECK: No pain or stiffness  BREASTS: No pain, masses, or nipple discharge  RESPIRATORY: SOB  CARDIOVASCULAR: No chest pain, palpitations, dizziness,   GASTROINTESTINAL: No abdominal or epigastric pain. No nausea, vomiting, or hematemesis  GENITOURINARY: No dysuria, frequency, hematuria, or incontinence  NEUROLOGICAL: No headaches, memory loss, loss of strength, numbness, or tremors  SKIN: Diffuse erythema, no blisters  LYMPH NODES: No enlarged glands  ENDOCRINE: No heat or cold intolerance; No hair loss  MUSCULOSKELETAL: No joint pain or swelling   PSYCHIATRIC: No depression, anxiety, mood swings, or difficulty sleeping  HEME/LYMPH: No easy bruising, or bleeding gums  ALLERY AND IMMUNOLOGIC: No hives or eczema      Vital Signs Last 24 Hrs  T(C): 36.3 (2020 07:26), Max: 36.5 (31 Dec 2019 21:50)  T(F): 97.4 (2020 07:26), Max: 97.7 (31 Dec 2019 21:50)  HR: 83 (2020 07:26) (81 - 88)  BP: 100/59 (2020 07:26) (98/60 - 110/64)  BP(mean): --  RR: 18 (2020 07:26) (18 - 20)  SpO2: 98% (2020 07:) (96% - 98%)  Daily Height in cm: 167.64 (31 Dec 2019 12:10)    Daily Weight in k.3 (2020 06:33)    PHYSICAL EXAM:    GENERAL: NAD  HEAD:  Atraumatic, Normocephalic  EYES: EOMI, PERRLA, conjunctiva and sclera clear  ENMT: No tonsillar erythema, exudates, or enlargement; Moist mucous membranes, Good dentition, No lesions  NECK: Supple, neck  veins full  NERVOUS SYSTEM:  Alert & Oriented X3, Good concentration; Motor Strength wnl upper and lower extremities  CHEST/LUNG: Clear to percussion bilaterally; No rales, rhonchi, wheezing, or rubs  HEART: Regular rate and rhythm; No murmurs, rubs, or gallops  ABDOMEN: Soft, Nontender, +mild distention   EXTREMITIES:  Edema ++  LYMPH: No lymphadenopathy noted  SKIN: No rashes or lesions, pale      LABS:                        7.3    4.29  )-----------( 64       ( 2020 06:52 )             21.7         141  |  106  |  112<H>  ----------------------------<  63<L>  3.3<L>   |  18<L>  |  3.60<H>    Ca    8.3<L>      2020 06:52  Phos  6.1       Mg     3.0         TPro  5.8<L>  /  Alb  2.8<L>  /  TBili  4.4<H>  /  DBili  x   /  AST  89<H>  /  ALT  75  /  AlkPhos  929<H>      PT/INR - ( 31 Dec 2019 13:34 )   PT: 14.4 sec;   INR: 1.25 ratio         PTT - ( 31 Dec 2019 13:34 )  PTT:30.6 sec    Magnesium, Serum: 3.0 mg/dL ( @ 06:52)  Phosphorus Level, Serum: 6.1 mg/dL ( @ 06:52)          RADIOLOGY & ADDITIONAL TESTS:

## 2020-01-01 NOTE — CONSULT NOTE ADULT - SUBJECTIVE AND OBJECTIVE BOX
HPI:  68 yo F PMHx liver CA, kidney disease, HTN presents to ED c/o generalized weakness and increasing shortness of breath x a few weeks. Pt states that she was admitted last week at [facility] for this issue but states nothing was done to improve symptoms. Denies recent illness, URI symptoms, fever/chills, chest pain, abd pain.  In ER patient was found to be anemic and in TOM.  patient is being admitted for further work up and treatment. (31 Dec 2019 17:14)      PAST MEDICAL & SURGICAL HISTORY:  Liver cancer  Sarcoma: liver  Cancer of leg, right  Kidney stone  Hypertension  Renal calculi  Hypertension  S/P dilatation and curettage: for polyps  Cancer of leg, right: excision of tumor and txted with radiaton therapy  S/P cholecystectomy: appendectomy  History of nephrectomy  S/P arthroscopic surgery of left knee:   S/P tubal ligation  S/P appendectomy: cholecystectomy -   H/O partial nephrectomy: left -   S/P D&C: 2012  S/P  Section: 1968       SOCIAL HISTORY:                                     Admitted from:  HOME       FAMILY HISTORY:  Family history of breast cancer in sister (Sibling)  Family history of hypertension in mother  Family history of stroke: father  at 70 yr old      MEDICATIONS  (STANDING):  albumin human 25% IVPB 50 milliLiter(s) IV Intermittent every 12 hours  dronabinol 2.5 milliGRAM(s) Oral two times a day  furosemide    Tablet 20 milliGRAM(s) Oral three times a day  metolazone 2.5 milliGRAM(s) Oral daily  potassium chloride    Tablet ER 20 milliEquivalent(s) Oral every 2 hours  sodium bicarbonate 650 milliGRAM(s) Oral three times a day    MEDICATIONS  (PRN):      Allergies    No Known Allergies    Intolerances          ADVANCE DIRECTIVES:    DNR                     MOLST    Living Will      Surrogate/HCP/Guardian: daughter     Phone#:      Baseline ADLs (prior to admission):  Independent [ x] moderately [ ] fully   Dependent   [ ] moderately [ ]fully    Palliative Performance Status Version 2:          PPS Level -- 60%  Ambulation -- Reduced  Activity & Evidence of Disease -- Unable hobby/house work; Significant disease  Self-Care -- Occasional assistance necessary  Intake -- Normal or reduced  Conscious Level -- Full    Review of Systems: Reviewed              Unable to obtain due to poor mentation   All others negative    Dyspnea: Y  Nausea/Vomiting: N  Anxiety:  N  Depression: N  Fatigue: Y  Loss of appetite: Y    Pain:  none            Character-            Duration-            Factors-            Frequency-            Location-            Severity-    Vital Signs Last 24 Hrs  T(C): 36.3 (2020 07:26), Max: 36.5 (31 Dec 2019 21:50)  T(F): 97.4 (2020 07:26), Max: 97.7 (31 Dec 2019 21:50)  HR: 83 (2020 07:) (83 - 88)  BP: 100/59 (2020 07:) (99/54 - 110/64)  BP(mean): --  RR: 18 (2020 07:26) (18 - 20)  SpO2: 98% (2020 07:) (96% - 98%)      General: alert  oriented x _3___                 ill appearing    HEENT: NC/AT                   Lungs: comfortable  CTA B/L                CV: S1S2 RRR  GI: soft,  NT,  BS+              MSK: edema             ambulatory          LABS:                        7.3    4.29  )-----------( 64       ( 2020 06:52 )             21.7         141  |  106  |  112<H>  ----------------------------<  63<L>  3.3<L>   |  18<L>  |  3.60<H>    Ca    8.3<L>      2020 06:52  Phos  6.1       Mg     3.0         TPro  5.8<L>  /  Alb  2.8<L>  /  TBili  4.4<H>  /  DBili  x   /  AST  89<H>  /  ALT  75  /  AlkPhos  929<H>      PT/INR - ( 31 Dec 2019 13:34 )   PT: 14.4 sec;   INR: 1.25 ratio         PTT - ( 31 Dec 2019 13:34 )  PTT:30.6 sec    I&O's Summary    2020 07:01  -  2020 12:40  --------------------------------------------------------  IN: 0 mL / OUT: 800 mL / NET: -800 mL        RADIOLOGY & ADDITIONAL STUDIES:      PSYCHOSOCIAL-SPIRITUAL ASSESSMENT:    _x__Reviewed     ___Care  plan unchanged     _x__Care plan adjusted as below    GOALS OF CARE DISCUSSION  	_x__Palliative care info/counseling provided	    _x__Family meeting  	_x__Advanced Directives addressed	    ___See previous Palliative Medicine Note    AGENCY CHOICE DISCUSSED:   _x__HOSPICE   ___CALVARY  ___OTHER:              > 50% OF THE TIME SPENT IN COUNSELING AND COORDINATING CARE 	   Start:               End:  	       Minutes: 85      PROLONGED SERVICE             FACE TO FACE:    PT            PT & FAMILY	   Start:               End:  	       Minutes:      Advance Care Planning Time:

## 2020-01-01 NOTE — DIETITIAN INITIAL EVALUATION ADULT. - OTHER INFO
68 yo F PMHx sarcoma, liver mets, kidney disease, HTN presents to ED c/o generalized weakness and increasing shortness of breath x a few weeks.  Possible now lung mets as well.  Unable to interview pt as off floor for echocardiogram and venous doppler.  RDN note from last admission in May reviewed.  Wt hx then revealed a UBW of 203# with loss to 170# over 1 year.  Current wt noted to be 221# today.  Pt dx with moderate chronic malnutrition in May.  CT ab reveals ascites, anasarca.  Received 1U PRBC 12/31. Historically no difficulty chewing or swallowing.  Spoke with pt's spouse and daughter.  Pt does not eat well, eats small amounts.  Doesn't eat protein foods despite recommendation by oncologist.  Chemo induced taste changes reported.  Per family pt had regained some the lost wt to ~193# 2 weeks ago at oncology office.  Current higher wt likely due to fluid retention.  Spouse reports pt with diarrhea past 2 days as well.  Unable to conduct NFPE at pt at test.  However, still able to dx with severe chronic malnutrition based on inadequate protein energy intake for ~1 year and severe edema.  Will attempt physical exam at following visit. 70 yo F PMHx sarcoma, liver mets, kidney disease, HTN presents to ED c/o generalized weakness and increasing shortness of breath x a few weeks.  Possible now lung mets as well.  Unable to interview pt as off floor for echocardiogram and venous doppler.  RDN note from last admission in May reviewed.  Wt hx then revealed a UBW of 203# with loss to 170# over 1 year.  Current wt noted to be 221# today.  Pt dx with moderate chronic malnutrition in May.  CT ab reveals ascites, anasarca.  Received 1U PRBC 12/31. Historically no difficulty chewing or swallowing.  Spoke with pt's spouse and daughter.  Pt does not eat well, eats small amounts.  Doesn't eat protein foods despite recommendation by oncologist.  Chemo induced taste changes reported.  Per family pt had regained some the lost wt to ~193# 2 weeks ago at oncology office.  Current higher wt likely due to fluid retention.  Spouse reports pt with diarrhea past 2 days as well.  Unable to conduct NFPE at pt at test.  However, still able to dx with severe chronic malnutrition based on inadequate protein energy intake for ~1 year and severe edema.  Will attempt physical exam at following visit.   Correlate anasarca/edema to poor protein intake/liver mets

## 2020-01-01 NOTE — CONSULT NOTE ADULT - ASSESSMENT
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     Recommendations:  1.  follow CBC  2.  renal evaluation for diuresis for comfort  3.  follow LFts  4.  discussed with patient and family diagnosis and explanation of current clinical and radiogrpahic findings. Patient with clear lab and radiographic evidence of progressive disease and is not a candidate for additional treatment.  would continue current palliative care and would have hospice evaluation which they are agreeable to  5.  further heme onc recommendations pending above

## 2020-01-01 NOTE — CONSULT NOTE ADULT - ASSESSMENT
69 F with metastatic sarcoma comes with SOB, anasarca, TOM, anemia and transaminitis. Spoke to patient and family regarding goals of care. Patient and family are agreeable for home hospice. She is also willing to complete MOLST and sign DNR/DNI. Will follow. 69 F with metastatic sarcoma comes with SOB, anasarca, TOM, anemia and transaminitis. Spoke to patient and family regarding goals of care. Patient and family are agreeable for home hospice. She is also willing to complete MOLST and sign DNR/DNI. Continue diuresis and transfuse prn. Will follow.

## 2020-01-01 NOTE — CONSULT NOTE ADULT - SUBJECTIVE AND OBJECTIVE BOX
Patient is a 69y old  Female who presents with a chief complaint of sob (2020 10:14)      HPI:  70 yo F PMHx liver CA, kidney disease, HTN presents to ED c/o generalized weakness and increasing shortness of breath x a few weeks. Pt states that she was admitted last week at [facility] for this issue but states nothing was done to improve symptoms. Denies recent illness, URI symptoms, fever/chills, chest pain, abd pain.  In ER patient was found to be anemic and in TOM.  patient is being admitted for further work up and treatment. (31 Dec 2019 17:14)       ROS: is followed by Dr. Hernández if office. last CT scans 2019 with minimal progressive disease  Negative except for:    PAST MEDICAL & SURGICAL HISTORY:  Liver cancer  Sarcoma: liver  Cancer of leg, right  Kidney stone  Hypertension  Renal calculi  Hypertension  S/P dilatation and curettage: for polyps  Cancer of leg, right: excision of tumor and txted with radiaton therapy  S/P cholecystectomy: appendectomy  History of nephrectomy  S/P arthroscopic surgery of left knee:   S/P tubal ligation  S/P appendectomy: cholecystectomy -   H/O partial nephrectomy: left -   S/P D&C: 2012  S/P  Section: 1968      SOCIAL HISTORY:    FAMILY HISTORY:  Family history of breast cancer in sister (Sibling)  Family history of hypertension in mother  Family history of stroke: father  at 70 yr old      MEDICATIONS  (STANDING):  albumin human 25% IVPB 50 milliLiter(s) IV Intermittent every 12 hours  dronabinol 2.5 milliGRAM(s) Oral two times a day  furosemide    Tablet 20 milliGRAM(s) Oral three times a day  metolazone 2.5 milliGRAM(s) Oral daily  potassium chloride    Tablet ER 20 milliEquivalent(s) Oral every 2 hours  sodium bicarbonate 650 milliGRAM(s) Oral three times a day    MEDICATIONS  (PRN):      Allergies    No Known Allergies    Intolerances        Vital Signs Last 24 Hrs  T(C): 36.3 (2020 07:26), Max: 36.5 (31 Dec 2019 21:50)  T(F): 97.4 (2020 07:26), Max: 97.7 (31 Dec 2019 21:50)  HR: 83 (2020 07:26) (83 - 88)  BP: 100/59 (2020 07:26) (99/54 - 110/64)  BP(mean): --  RR: 18 (2020 07:26) (18 - 20)  SpO2: 98% (2020 07:26) (96% - 98%)    PHYSICAL EXAM  General: adult in NAD  HEENT: clear oropharynx, anicteric sclera, pink conjunctivae  Neck: supple  CV: normal S1S2 with no murmur rubs or gallops  Lungs: clear to auscultation, no wheezes, no rhales  Abdomen: soft non-tender non-distended, no hepato/splenomegaly  Ext: no clubbing cyanosis or edema  Skin: no rashes and no petichiae  Neuro: alert and oriented X3 no focal deficits      LABS:    CBC Full  -  ( 2020 06:52 )  WBC Count : 4.29 K/uL  RBC Count : 2.08 M/uL  Hemoglobin : 7.3 g/dL  Hematocrit : 21.7 %  Platelet Count - Automated : 64 K/uL  Mean Cell Volume : 104.3 fl  Mean Cell Hemoglobin : 35.1 pg  Mean Cell Hemoglobin Concentration : 33.6 gm/dL  Auto Neutrophil # : x  Auto Lymphocyte # : x  Auto Monocyte # : x  Auto Eosinophil # : x  Auto Basophil # : x  Auto Neutrophil % : x  Auto Lymphocyte % : x  Auto Monocyte % : x  Auto Eosinophil % : x  Auto Basophil % : x        141  |  106  |  112<H>  ----------------------------<  63<L>  3.3<L>   |  18<L>  |  3.60<H>    Ca    8.3<L>      2020 06:52  Phos  6.1       Mg     3.0         TPro  5.8<L>  /  Alb  2.8<L>  /  TBili  4.4<H>  /  DBili  x   /  AST  89<H>  /  ALT  75  /  AlkPhos  929<H>      PT/INR - ( 31 Dec 2019 13:34 )   PT: 14.4 sec;   INR: 1.25 ratio         PTT - ( 31 Dec 2019 13:34 )  PTT:30.6 sec          BLOOD SMEAR INTERPRETATION:    RADIOLOGY & ADDITIONAL STUDIES:    < from: US Abdomen Complete (19 @ 16:13) >  EXAM:  US ABDOMEN COMPLETE                            PROCEDURE DATE:  2019          INTERPRETATION:    History: Abdominal distention.    Abdominal ultrasound. Prior 2019.    Liver is grossly abnormal enlarged measuring up to 24 cm long dimension markedly heterogeneous with multiple solid masses consistent with metastatic disease. The appearance is similar to prior. Gallbladder surgically absent. Common duct 0.8 cm, normal for postcholecystectomy status. Pancreas not adequately visualized. Spleen is not enlarged. Right kidney not well visualized due to marked hepatic enlargement left kidney not visualized adequately due to shadowing from bowel gas. No ascites.        Impression:    Limited study.  Hepatic metastases similar to prior. No gross acute pathology.                LILIANA ROWELL M.D., ATTENDING RADIOLOGIST  This document has been electronically signed. Dec 31 2019  4:22PM          < end of copied text >

## 2020-01-01 NOTE — DIETITIAN INITIAL EVALUATION ADULT. - ENERGY INTAKE
Familty states pt doesn't eat much. Pt with very poor protein intake. Doesn't like the taste of meats anymore. Refuses Ensure or similar products.  Marinol helps a little bit. Poor (<50%)

## 2020-01-01 NOTE — PROGRESS NOTE ADULT - SUBJECTIVE AND OBJECTIVE BOX
INTERVAL HPI/OVERNIGHT EVENTS:  pt seen and examined  feels better  denies n/v/d/c/abd pain  denies melena/hematochezia  tolerating po  per rn no s/s active gib, sp 1u prbc yesterday  labs noted    MEDICATIONS  (STANDING):  dronabinol 2.5 milliGRAM(s) Oral two times a day  furosemide    Tablet 20 milliGRAM(s) Oral three times a day  heparin  Injectable 5000 Unit(s) SubCutaneous every 12 hours  metolazone 2.5 milliGRAM(s) Oral daily    MEDICATIONS  (PRN):      Allergies    No Known Allergies    Intolerances        Review of Systems:    General:  No wt loss, fevers, chills, night sweats, fatigue   Eyes:  Good vision, no reported pain  ENT:  No sore throat, pain, runny nose, dysphagia  CV:  No pain, palpitations, hypo/hypertension  Resp:  No dyspnea, cough, tachypnea, wheezing  GI:  No pain, No nausea, No vomiting, No diarrhea, No constipation, No weight loss, No fever, No pruritis, No rectal bleeding, No melena, No dysphagia  :  No pain, bleeding, incontinence, nocturia  Muscle:  No pain, weakness  Neuro:  No weakness, tingling, memory problems  Psych:  No fatigue, insomnia, mood problems, depression  Endocrine:  No polyuria, polydypsia, cold/heat intolerance  Heme:  No petechiae, ecchymosis, easy bruisability  Skin:  No rash, tattoos, scars, edema      Vital Signs Last 24 Hrs  T(C): 36.3 (01 Jan 2020 07:26), Max: 36.5 (31 Dec 2019 21:50)  T(F): 97.4 (01 Jan 2020 07:26), Max: 97.7 (31 Dec 2019 21:50)  HR: 83 (01 Jan 2020 07:26) (81 - 88)  BP: 100/59 (01 Jan 2020 07:26) (98/60 - 110/64)  BP(mean): --  RR: 18 (01 Jan 2020 07:26) (18 - 20)  SpO2: 98% (01 Jan 2020 07:26) (96% - 98%)    PHYSICAL EXAM:    Constitutional: lying in bed  HEENT: ncat  Gastrointestinal: firm dt +scar to abd  Extremities: erythema/edema  Neurological: A/O x 3      LABS:                        7.3    4.29  )-----------( 64       ( 01 Jan 2020 06:52 )             21.7     01-01    141  |  106  |  112<H>  ----------------------------<  63<L>  3.3<L>   |  18<L>  |  3.60<H>    Ca    8.3<L>      01 Jan 2020 06:52  Phos  6.1     01-01  Mg     3.0     01-01    TPro  5.8<L>  /  Alb  2.8<L>  /  TBili  4.4<H>  /  DBili  x   /  AST  89<H>  /  ALT  75  /  AlkPhos  929<H>  01-01    PT/INR - ( 31 Dec 2019 13:34 )   PT: 14.4 sec;   INR: 1.25 ratio         PTT - ( 31 Dec 2019 13:34 )  PTT:30.6 sec      RADIOLOGY & ADDITIONAL TESTS:

## 2020-01-01 NOTE — PROGRESS NOTE ADULT - SUBJECTIVE AND OBJECTIVE BOX
Patient is a 69y old  Female who presents with a chief complaint of sob (01 Jan 2020 12:39)      INTERVAL /OVERNIGHT EVENTS: still SOB, feels depressed    MEDICATIONS  (STANDING):  albumin human 25% IVPB 50 milliLiter(s) IV Intermittent every 12 hours  dronabinol 5 milliGRAM(s) Oral two times a day  furosemide    Tablet 20 milliGRAM(s) Oral three times a day  gabapentin 100 milliGRAM(s) Oral three times a day  metolazone 2.5 milliGRAM(s) Oral daily  mirtazapine 7.5 milliGRAM(s) Oral at bedtime  potassium chloride    Tablet ER 20 milliEquivalent(s) Oral every 2 hours  sodium bicarbonate 650 milliGRAM(s) Oral three times a day  tamsulosin 0.4 milliGRAM(s) Oral at bedtime    MEDICATIONS  (PRN):  morphine  - Injectable 2 milliGRAM(s) IV Push every 6 hours PRN Severe Pain (7 - 10)  oxyCODONE    IR 5 milliGRAM(s) Oral four times a day PRN Moderate Pain (4 - 6)  traMADol 25 milliGRAM(s) Oral four times a day PRN Mild Pain (1 - 3)      Allergies    No Known Allergies    Intolerances        REVIEW OF SYSTEMS:  CONSTITUTIONAL: No fever, weight loss, or fatigue  EYES: No eye pain, visual disturbances, or discharge  ENMT:  No difficulty hearing, tinnitus, vertigo; No sinus or throat pain  NECK: No pain or stiffness  RESPIRATORY: No cough, wheezing, chills or hemoptysis; + shortness of breath  CARDIOVASCULAR: No chest pain, palpitations, dizziness, or leg swelling  GASTROINTESTINAL: No abdominal or epigastric pain. No nausea, vomiting, or hematemesis; No diarrhea or constipation. No melena or hematochezia.  GENITOURINARY: No dysuria, frequency, hematuria, or incontinence  NEUROLOGICAL: No headaches, memory loss, loss of strength, numbness, or tremors  SKIN: No itching, burning, rashes, or lesions   LYMPH NODES: No enlarged glands  ENDOCRINE: No heat or cold intolerance; No hair loss; No polydipsia or polyuria  MUSCULOSKELETAL: No joint pain or swelling; No muscle, back, or extremity pain  PSYCHIATRIC: No depression, anxiety, mood swings, or difficulty sleeping  HEME/LYMPH: No easy bruising, or bleeding gums  ALLERGY AND IMMUNOLOGIC: No hives or eczema    Vital Signs Last 24 Hrs  T(C): 36.3 (01 Jan 2020 07:26), Max: 36.5 (31 Dec 2019 21:50)  T(F): 97.4 (01 Jan 2020 07:26), Max: 97.7 (31 Dec 2019 21:50)  HR: 83 (01 Jan 2020 07:26) (83 - 88)  BP: 100/59 (01 Jan 2020 07:26) (99/54 - 110/64)  BP(mean): --  RR: 18 (01 Jan 2020 07:26) (18 - 20)  SpO2: 98% (01 Jan 2020 07:26) (96% - 98%)    PHYSICAL EXAM:  GENERAL: NAD, well-groomed, well-developed  HEAD:  Atraumatic, Normocephalic  EYES: EOMI, PERRLA, conjunctiva and sclera clear  ENMT: No tonsillar erythema, exudates, or enlargement; Moist mucous membranes, Good dentition, No lesions  NECK: Supple, No JVD, Normal thyroid  NERVOUS SYSTEM:  Alert & Oriented X3, Good concentration; Motor Strength 5/5 B/L upper and lower extremities; DTRs 2+ intact and symmetric  CHEST/LUNG: Clear to auscultation bilaterally; No rales, rhonchi, wheezing, or rubs  HEART: Regular rate and rhythm; No murmurs, rubs, or gallops  ABDOMEN: Soft, Nontender, Nondistended; Bowel sounds present  EXTREMITIES:  2+ Peripheral Pulses, No clubbing, cyanosis, + edema  LYMPH: No lymphadenopathy noted  SKIN: No rashes or lesions    LABS:                        7.3    4.29  )-----------( 64       ( 01 Jan 2020 06:52 )             21.7     01 Jan 2020 06:52    141    |  106    |  112    ----------------------------<  63     3.3     |  18     |  3.60     Ca    8.3        01 Jan 2020 06:52  Phos  6.1       01 Jan 2020 06:52  Mg     3.0       01 Jan 2020 06:52    TPro  5.8    /  Alb  2.8    /  TBili  4.4    /  DBili  x      /  AST  89     /  ALT  75     /  AlkPhos  929    01 Jan 2020 06:52    PT/INR - ( 31 Dec 2019 13:34 )   PT: 14.4 sec;   INR: 1.25 ratio         PTT - ( 31 Dec 2019 13:34 )  PTT:30.6 sec    CAPILLARY BLOOD GLUCOSE          RADIOLOGY & ADDITIONAL TESTS:    Notes Reviewed:  [x ] YES  [ ] NO    Care Discussed with Consultants/Other Providers [x ] YES  [ ] NO

## 2020-01-01 NOTE — CHART NOTE - NSCHARTNOTEFT_GEN_A_CORE
Upon Nutritional Assessment by the Registered Dietitian your patient was determined to meet criteria / has evidence of the following diagnosis/diagnoses:          [ ]  Mild Protein Calorie Malnutrition        [ ]  Moderate Protein Calorie Malnutrition        [x] Severe Protein Calorie Malnutrition        [ ] Unspecified Protein Calorie Malnutrition        [ ] Underweight / BMI <19        [ ] Morbid Obesity / BMI > 40      Findings as based on:  •  Comprehensive nutrition assessment and consultation  •  Calorie counts (nutrient intake analysis)  •  Food acceptance and intake status from observations by staff  •  Conversation with family.  Presence of severe edema, inadequate protein-energy intake for ~1 year.       Treatment:    The following diet has been recommended:  Continue diet as ordered.  Trial of Ensure Clear and Magic Cup fortified ice cream.   Encourage po intake.    ?IV Albumin    PROVIDER Section:     By signing this assessment you are acknowledging and agree with the diagnosis/diagnoses assigned by the Registered Dietitian    Comments:

## 2020-01-01 NOTE — DIETITIAN INITIAL EVALUATION ADULT. - MALNUTRITION
severe in setting of chronic illness related to inadequate protein energy intake in setting of chemo related taste changes evidenced by severe edema/anasarca, poor oral intake for past year.

## 2020-01-02 ENCOUNTER — APPOINTMENT (OUTPATIENT)
Dept: UROLOGY | Facility: CLINIC | Age: 70
End: 2020-01-02

## 2020-01-02 DIAGNOSIS — C22.9 MALIGNANT NEOPLASM OF LIVER, NOT SPECIFIED AS PRIMARY OR SECONDARY: ICD-10-CM

## 2020-01-02 LAB
ALBUMIN SERPL ELPH-MCNC: 2.7 G/DL — LOW (ref 3.3–5)
ALP SERPL-CCNC: 924 U/L — HIGH (ref 40–120)
ALT FLD-CCNC: 70 U/L — SIGNIFICANT CHANGE UP (ref 12–78)
ANION GAP SERPL CALC-SCNC: 14 MMOL/L — SIGNIFICANT CHANGE UP (ref 5–17)
AST SERPL-CCNC: 87 U/L — HIGH (ref 15–37)
BILIRUB DIRECT SERPL-MCNC: 3.1 MG/DL — HIGH (ref 0.05–0.2)
BILIRUB INDIRECT FLD-MCNC: 0.9 MG/DL — SIGNIFICANT CHANGE UP (ref 0.2–1)
BILIRUB SERPL-MCNC: 4 MG/DL — HIGH (ref 0.2–1.2)
BUN SERPL-MCNC: 118 MG/DL — HIGH (ref 7–23)
CALCIUM SERPL-MCNC: 8.3 MG/DL — LOW (ref 8.5–10.1)
CHLORIDE SERPL-SCNC: 107 MMOL/L — SIGNIFICANT CHANGE UP (ref 96–108)
CO2 SERPL-SCNC: 20 MMOL/L — LOW (ref 22–31)
CREAT SERPL-MCNC: 3.7 MG/DL — HIGH (ref 0.5–1.3)
GLUCOSE SERPL-MCNC: 50 MG/DL — LOW (ref 70–99)
HCT VFR BLD CALC: 20.6 % — CRITICAL LOW (ref 34.5–45)
HCT VFR BLD CALC: 21 % — CRITICAL LOW (ref 34.5–45)
HGB BLD-MCNC: 7 G/DL — CRITICAL LOW (ref 11.5–15.5)
HGB BLD-MCNC: 7.2 G/DL — LOW (ref 11.5–15.5)
MAGNESIUM SERPL-MCNC: 2.9 MG/DL — HIGH (ref 1.6–2.6)
MCHC RBC-ENTMCNC: 34 GM/DL — SIGNIFICANT CHANGE UP (ref 32–36)
MCHC RBC-ENTMCNC: 35.4 PG — HIGH (ref 27–34)
MCV RBC AUTO: 104 FL — HIGH (ref 80–100)
NRBC # BLD: 0 /100 WBCS — SIGNIFICANT CHANGE UP (ref 0–0)
PHOSPHATE SERPL-MCNC: 6 MG/DL — HIGH (ref 2.5–4.5)
PLATELET # BLD AUTO: 64 K/UL — LOW (ref 150–400)
POTASSIUM SERPL-MCNC: 3.9 MMOL/L — SIGNIFICANT CHANGE UP (ref 3.5–5.3)
POTASSIUM SERPL-SCNC: 3.9 MMOL/L — SIGNIFICANT CHANGE UP (ref 3.5–5.3)
PROT SERPL-MCNC: 5.6 G/DL — LOW (ref 6–8.3)
RBC # BLD: 1.98 M/UL — LOW (ref 3.8–5.2)
RBC # FLD: 25.2 % — HIGH (ref 10.3–14.5)
SODIUM SERPL-SCNC: 141 MMOL/L — SIGNIFICANT CHANGE UP (ref 135–145)
WBC # BLD: 4.87 K/UL — SIGNIFICANT CHANGE UP (ref 3.8–10.5)
WBC # FLD AUTO: 4.87 K/UL — SIGNIFICANT CHANGE UP (ref 3.8–10.5)

## 2020-01-02 PROCEDURE — 93010 ELECTROCARDIOGRAM REPORT: CPT

## 2020-01-02 RX ORDER — HYDROMORPHONE HYDROCHLORIDE 2 MG/ML
0.5 INJECTION INTRAMUSCULAR; INTRAVENOUS; SUBCUTANEOUS
Refills: 0 | Status: DISCONTINUED | OUTPATIENT
Start: 2020-01-02 | End: 2020-01-03

## 2020-01-02 RX ORDER — DIGOXIN 250 MCG
0.12 TABLET ORAL DAILY
Refills: 0 | Status: DISCONTINUED | OUTPATIENT
Start: 2020-01-02 | End: 2020-01-03

## 2020-01-02 RX ADMIN — MIRTAZAPINE 7.5 MILLIGRAM(S): 45 TABLET, ORALLY DISINTEGRATING ORAL at 21:50

## 2020-01-02 RX ADMIN — MIDODRINE HYDROCHLORIDE 5 MILLIGRAM(S): 2.5 TABLET ORAL at 05:52

## 2020-01-02 RX ADMIN — Medication 650 MILLIGRAM(S): at 21:51

## 2020-01-02 RX ADMIN — MIDODRINE HYDROCHLORIDE 5 MILLIGRAM(S): 2.5 TABLET ORAL at 12:35

## 2020-01-02 RX ADMIN — Medication 20 MILLIGRAM(S): at 18:44

## 2020-01-02 RX ADMIN — Medication 50 MILLILITER(S): at 05:52

## 2020-01-02 RX ADMIN — GABAPENTIN 100 MILLIGRAM(S): 400 CAPSULE ORAL at 05:52

## 2020-01-02 RX ADMIN — Medication 650 MILLIGRAM(S): at 05:52

## 2020-01-02 RX ADMIN — GABAPENTIN 100 MILLIGRAM(S): 400 CAPSULE ORAL at 21:50

## 2020-01-02 RX ADMIN — MIDODRINE HYDROCHLORIDE 5 MILLIGRAM(S): 2.5 TABLET ORAL at 17:20

## 2020-01-02 RX ADMIN — SENNA PLUS 2 TABLET(S): 8.6 TABLET ORAL at 21:51

## 2020-01-02 RX ADMIN — GABAPENTIN 100 MILLIGRAM(S): 400 CAPSULE ORAL at 14:18

## 2020-01-02 RX ADMIN — TAMSULOSIN HYDROCHLORIDE 0.4 MILLIGRAM(S): 0.4 CAPSULE ORAL at 21:51

## 2020-01-02 RX ADMIN — Medication 50 MILLILITER(S): at 18:44

## 2020-01-02 RX ADMIN — Medication 5 MILLIGRAM(S): at 17:20

## 2020-01-02 RX ADMIN — Medication 650 MILLIGRAM(S): at 14:18

## 2020-01-02 RX ADMIN — Medication 5 MILLIGRAM(S): at 09:02

## 2020-01-02 RX ADMIN — Medication 0.12 MILLIGRAM(S): at 12:35

## 2020-01-02 NOTE — PROGRESS NOTE ADULT - SUBJECTIVE AND OBJECTIVE BOX
Patient is a 69y old  Female who presents with a chief complaint of sob (02 Jan 2020 14:20)      INTERVAL /OVERNIGHT EVENTS: still with SOB    MEDICATIONS  (STANDING):  albumin human 25% IVPB 50 milliLiter(s) IV Intermittent every 12 hours  digoxin     Tablet 0.125 milliGRAM(s) Oral daily  dronabinol 5 milliGRAM(s) Oral two times a day  furosemide   Injectable 20 milliGRAM(s) IV Push every 6 hours  gabapentin 100 milliGRAM(s) Oral three times a day  midodrine. 5 milliGRAM(s) Oral three times a day  mirtazapine 7.5 milliGRAM(s) Oral at bedtime  senna 2 Tablet(s) Oral at bedtime  sodium bicarbonate 650 milliGRAM(s) Oral three times a day  tamsulosin 0.4 milliGRAM(s) Oral at bedtime    MEDICATIONS  (PRN):  bisacodyl 5 milliGRAM(s) Oral every 12 hours PRN Constipation  HYDROmorphone  Injectable 0.5 milliGRAM(s) IV Push four times a day PRN breakthrough pain  morphine  - Injectable 2 milliGRAM(s) IV Push every 6 hours PRN Severe Pain (7 - 10)  oxyCODONE    IR 5 milliGRAM(s) Oral four times a day PRN Moderate Pain (4 - 6)  traMADol 25 milliGRAM(s) Oral four times a day PRN Mild Pain (1 - 3)      Allergies    No Known Allergies    Intolerances        REVIEW OF SYSTEMS:  CONSTITUTIONAL: +, weight loss, + fatigue  EYES: No eye pain, visual disturbances, or discharge  ENMT:  No difficulty hearing, tinnitus, vertigo; No sinus or throat pain  NECK: No pain or stiffness  RESPIRATORY: No cough, wheezing, chills or hemoptysis; + shortness of breath  CARDIOVASCULAR: No chest pain, palpitations, dizziness, or leg swelling  GASTROINTESTINAL: No abdominal or epigastric pain. No nausea, vomiting, or hematemesis; No diarrhea or constipation. No melena or hematochezia.  GENITOURINARY: No dysuria, frequency, hematuria, or incontinence  NEUROLOGICAL: No headaches, memory loss, loss of strength, numbness, or tremors  SKIN: No itching, burning, rashes, or lesions   LYMPH NODES: No enlarged glands  ENDOCRINE: No heat or cold intolerance; No hair loss; No polydipsia or polyuria  MUSCULOSKELETAL: No joint pain or swelling; No muscle, back, or extremity pain  PSYCHIATRIC: No depression, anxiety, mood swings, or difficulty sleeping  HEME/LYMPH: No easy bruising, or bleeding gums  ALLERGY AND IMMUNOLOGIC: No hives or eczema    Vital Signs Last 24 Hrs  T(C): 36.5 (02 Jan 2020 15:25), Max: 36.7 (01 Jan 2020 16:07)  T(F): 97.7 (02 Jan 2020 15:25), Max: 98 (01 Jan 2020 16:07)  HR: 86 (02 Jan 2020 15:25) (78 - 151)  BP: 99/61 (02 Jan 2020 15:25) (88/50 - 99/61)  BP(mean): --  RR: 16 (02 Jan 2020 15:25) (16 - 18)  SpO2: 98% (02 Jan 2020 15:25) (94% - 98%)    PHYSICAL EXAM:  GENERAL: NAD, well-groomed, well-developed  HEAD:  Atraumatic, Normocephalic  EYES: EOMI, PERRLA, conjunctiva and sclera clear  ENMT: No tonsillar erythema, exudates, or enlargement; Moist mucous membranes, Good dentition, No lesions  NECK: Supple, No JVD, Normal thyroid  NERVOUS SYSTEM:  Alert & Oriented X3, Good concentration; Motor Strength 5/5 B/L upper and lower extremities; DTRs 2+ intact and symmetric  CHEST/LUNG: Clear to auscultation bilaterally; No rales, rhonchi, wheezing, or rubs  HEART: Regular rate and rhythm; No murmurs, rubs, or gallops  ABDOMEN: Soft, Nontender, Nondistended; Bowel sounds present  EXTREMITIES:  2+ Peripheral Pulses, No clubbing, cyanosis, + edema  LYMPH: No lymphadenopathy noted  SKIN: LLE wound    LABS:                        7.2    x     )-----------( x        ( 02 Jan 2020 08:55 )             21.0     02 Jan 2020 06:13    141    |  107    |  118    ----------------------------<  50     3.9     |  20     |  3.70     Ca    8.3        02 Jan 2020 06:13  Phos  6.0       02 Jan 2020 06:13  Mg     2.9       02 Jan 2020 06:13    TPro  5.6    /  Alb  2.7    /  TBili  4.0    /  DBili  3.10   /  AST  87     /  ALT  70     /  AlkPhos  924    02 Jan 2020 06:13        CAPILLARY BLOOD GLUCOSE          RADIOLOGY & ADDITIONAL TESTS:    Notes Reviewed:  [x ] YES  [ ] NO    Care Discussed with Consultants/Other Providers [x ] YES  [ ] NO

## 2020-01-02 NOTE — PROGRESS NOTE ADULT - ASSESSMENT
elevated lfts  anemia  fobt+   metastatic hemagiopericytoma      imaging reviewed- diffuse liver mets  rapid rise in lfts, suspect 2/2 progression of disease  trend lfts,  avoid hepatotoxins  anemic with no overt gi bleed  am labs noted, rec repeat h/h  monitor cbc, transfuse prn  cont bowel regimen  diet as tolerated; cont appetite stimulants   heme/onc eval noted, not a candidate for further tx  pall care following, hospice eval pending  prognosis poor

## 2020-01-02 NOTE — CHART NOTE - NSCHARTNOTEFT_GEN_A_CORE
Called by RN because patient in new onset Afib with rates in 130s. Patient seen at bedside. No known prior history of Afib. Patient is a 70 yo F with PMH liver ca here for SOB and weakness. Patient's cancer has progressed and wishes to be made DNR/DNI and plans to have hospice eval today.  Patient denies palpitations, SOB, CP, dizziness. Complaint of dry mouth. Patient does not wish to treat Afib.    Vital Signs Last 24 Hrs  T(C): 36.7 (01 Jan 2020 16:07), Max: 36.7 (01 Jan 2020 16:07)  T(F): 98 (01 Jan 2020 16:07), Max: 98 (01 Jan 2020 16:07)  HR: 78 (01 Jan 2020 22:34) (78 - 83)  BP: 88/50 (02 Jan 2020 05:45) (88/50 - 100/59)  BP(mean): --  RR: 17 (01 Jan 2020 16:07) (17 - 18)  SpO2: 98% (01 Jan 2020 16:07) (98% - 98%)    Physical Exam:  General: No Acute Distress  Neurology: AA&Ox3  Respiratory: diminished breath sounds bilateral bases  CV: tachycardic, irregular rate  Abdominal: Soft, Non-Tender, Distended, +Bowel Soundsx4  Extremities: + edema in bilateral LE    A/P   68 YO F with metastatic liver ca here with SOB and weakness and anemia now in new onset Afib and hypotension.  Patient is DNR/DNI, pursuing hospice eval    Patient declining rate controlling agents. Patient's GIB/anemia makeher poor candidate for AC.     Continue to monitor, RN to call with changes. Dr. Singh called by RN.

## 2020-01-02 NOTE — CHART NOTE - NSCHARTNOTEFT_GEN_A_CORE
Called by RN for nonsustained tachycardia in the 130s. Pt is end stage ca pending discharge to hospice. Pt asymptomatic, no complaints. Impression: ?episode of SVT vs bouts of afib likely 2/2 current medical comorbidities. Plan: continue current dose of dig, given current clinical status/pending hospice, will not subject to further workup, continue to monitor. HR improved over course of 1 hr and returned to baseline.

## 2020-01-02 NOTE — PROGRESS NOTE ADULT - ASSESSMENT
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     Recommendations:  1.  follow CBC  2.  renal evaluation for diuresis for comfort  3.  follow LFts  4.  discussed with patient and family diagnosis and explanation of current clinical and radiogrpahic findings. Patient with clear lab and radiographic evidence of progressive disease and is not a candidate for additional treatment.  hospice evaluation being done  5.  transfuse 1 unit PRBC  6.  further heme onc recommendations pending above  discussed with Nancy Bills and patient

## 2020-01-02 NOTE — GOALS OF CARE CONVERSATION - ADVANCED CARE PLANNING - CONVERSATION DETAILS
Spoke with pt at bedside with  and dtr. She consented to [facility] DNR DNI comfort,wants hospice eval for home

## 2020-01-02 NOTE — PROGRESS NOTE ADULT - ASSESSMENT
TOM/CKDIII  Metabolic acidosis  Hypokalemia  Anemia  Metastatic disease   SOB    - TOM on CKDIII. CKDIII baseline with Cr around 2  - TOM with possible HRS. HRS is a diagnosed of exclusion. Urine studies. IV albumin  - May need to add midodrine + octreotide   - Will need RRT if renal function continues to worsen, but needs oncology's input regarding overall prognosis   - Can continue Lasix. I would consider stopping Zaroxolyn and add Aldactone   - Echo pending  - Bladder scan   - Oral bicarb   - KCl  - Anemia per Heme/Onc and GI  -Patient has     Thank you for involving nephrology with the care of this patient TOM/CKDIII  Metabolic acidosis  Hypokalemia  Anemia  Metastatic disease   SOB    - TOM on CKDIII. CKDIII baseline with Cr around 2  - TOM with possible HRS. HRS is a diagnosed of exclusion. Urine studies. IV albumin  - Patient started on midodrine and receiving IV albumin  - continue low dose  Lasix.   - Urology consulted, gunter placed for retention  - Oral bicarb   - Anemia per Heme/Onc and GI  - Heme/onc note reviewed, not candidate for additional therapies.  - Hospice eval  - No acute indication for dialysis, but given her co morbidities and oncologic prognosis, she would be a poor candidate for dialysis.     Thank you for involving nephrology with the care of this patient TOM/CKDIII  Metabolic acidosis  Hypokalemia  Anemia  Metastatic disease   SOB    - TOM on CKDIII. CKDIII baseline with Cr around 2  - TOM with possible HRS. HRS is a diagnosed of exclusion. Urine studies. IV albumin  - Patient started on midodrine and receiving IV albumin  - continue low dose  Lasix.   - Urology consulted, gunter placed for retention  - Oral bicarb   - Anemia per Heme/Onc and GI  - Heme/onc note reviewed, not candidate for additional therapies.  - Hospice eval  - No acute indication for dialysis, but given her co morbidities and oncologic prognosis, she would be a poor candidate for dialysis.     d/w Dr. ospina/trina  Thank you for involving nephrology with the care of this patient

## 2020-01-02 NOTE — PROGRESS NOTE ADULT - SUBJECTIVE AND OBJECTIVE BOX
INTERVAL HPI/OVERNIGHT EVENTS:  pt seen and examined  afib overnight   1x episode of vomiting after dinner  currently denies n/v/d/c/abd pain  tolerating po  no s/s active gib per nursing    MEDICATIONS  (STANDING):  albumin human 25% IVPB 50 milliLiter(s) IV Intermittent every 12 hours  dronabinol 5 milliGRAM(s) Oral two times a day  furosemide   Injectable 20 milliGRAM(s) IV Push every 6 hours  gabapentin 100 milliGRAM(s) Oral three times a day  midodrine. 5 milliGRAM(s) Oral three times a day  mirtazapine 7.5 milliGRAM(s) Oral at bedtime  senna 2 Tablet(s) Oral at bedtime  sodium bicarbonate 650 milliGRAM(s) Oral three times a day  tamsulosin 0.4 milliGRAM(s) Oral at bedtime    MEDICATIONS  (PRN):  bisacodyl 5 milliGRAM(s) Oral every 12 hours PRN Constipation  morphine  - Injectable 2 milliGRAM(s) IV Push every 6 hours PRN Severe Pain (7 - 10)  oxyCODONE    IR 5 milliGRAM(s) Oral four times a day PRN Moderate Pain (4 - 6)  traMADol 25 milliGRAM(s) Oral four times a day PRN Mild Pain (1 - 3)      Allergies    No Known Allergies    Intolerances        Review of Systems:    General:  No wt loss, fevers, chills, night sweats, fatigue   Eyes:  Good vision, no reported pain  ENT:  No sore throat, pain, runny nose, dysphagia  CV:  No pain, palpitations, hypo/hypertension  Resp:  No dyspnea, cough, tachypnea, wheezing  GI:  No pain, No nausea, No vomiting, No diarrhea, No constipation, No weight loss, No fever, No pruritis, No rectal bleeding, No melena, No dysphagia  :  No pain, bleeding, incontinence, nocturia  Muscle:  No pain, weakness  Neuro:  No weakness, tingling, memory problems  Psych:  No fatigue, insomnia, mood problems, depression  Endocrine:  No polyuria, polydypsia, cold/heat intolerance  Heme:  No petechiae, ecchymosis, easy bruisability  Skin:  No rash, tattoos, scars, edema      Vital Signs Last 24 Hrs  T(C): 36.4 (02 Jan 2020 07:36), Max: 36.7 (01 Jan 2020 16:07)  T(F): 97.5 (02 Jan 2020 07:36), Max: 98 (01 Jan 2020 16:07)  HR: 151 (02 Jan 2020 07:36) (78 - 151)  BP: 90/56 (02 Jan 2020 07:36) (88/50 - 95/50)  BP(mean): --  RR: 17 (02 Jan 2020 07:36) (17 - 17)  SpO2: 94% (02 Jan 2020 07:36) (94% - 98%)    PHYSICAL EXAM:    Constitutional: lying in bed  HEENT: ncat  Gastrointestinal: firm dt +scar to abd  Extremities: erythema/edema le; generalized edema  Neurological: A/O x 3      LABS:                        7.0    4.87  )-----------( 64       ( 02 Jan 2020 06:13 )             20.6     01-02    141  |  107  |  118<H>  ----------------------------<  50<L>  3.9   |  20<L>  |  3.70<H>    Ca    8.3<L>      02 Jan 2020 06:13  Phos  6.0     01-02  Mg     2.9     01-02    TPro  5.6<L>  /  Alb  2.7<L>  /  TBili  4.0<H>  /  DBili  3.10<H>  /  AST  87<H>  /  ALT  70  /  AlkPhos  924<H>  01-02    PT/INR - ( 31 Dec 2019 13:34 )   PT: 14.4 sec;   INR: 1.25 ratio         PTT - ( 31 Dec 2019 13:34 )  PTT:30.6 sec      RADIOLOGY & ADDITIONAL TESTS:

## 2020-01-02 NOTE — PROGRESS NOTE ADULT - SUBJECTIVE AND OBJECTIVE BOX
Interval History:  still feels weak. denies bleeding    Chart reviewed and events noted;   Overnight events:    MEDICATIONS  (STANDING):  albumin human 25% IVPB 50 milliLiter(s) IV Intermittent every 12 hours  digoxin     Tablet 0.125 milliGRAM(s) Oral daily  dronabinol 5 milliGRAM(s) Oral two times a day  furosemide   Injectable 20 milliGRAM(s) IV Push every 6 hours  gabapentin 100 milliGRAM(s) Oral three times a day  midodrine. 5 milliGRAM(s) Oral three times a day  mirtazapine 7.5 milliGRAM(s) Oral at bedtime  senna 2 Tablet(s) Oral at bedtime  sodium bicarbonate 650 milliGRAM(s) Oral three times a day  tamsulosin 0.4 milliGRAM(s) Oral at bedtime    MEDICATIONS  (PRN):  bisacodyl 5 milliGRAM(s) Oral every 12 hours PRN Constipation  HYDROmorphone  Injectable 0.5 milliGRAM(s) IV Push four times a day PRN breakthrough pain  morphine  - Injectable 2 milliGRAM(s) IV Push every 6 hours PRN Severe Pain (7 - 10)  oxyCODONE    IR 5 milliGRAM(s) Oral four times a day PRN Moderate Pain (4 - 6)  traMADol 25 milliGRAM(s) Oral four times a day PRN Mild Pain (1 - 3)      Vital Signs Last 24 Hrs  T(C): 36.4 (02 Jan 2020 07:36), Max: 36.7 (01 Jan 2020 16:07)  T(F): 97.5 (02 Jan 2020 07:36), Max: 98 (01 Jan 2020 16:07)  HR: 151 (02 Jan 2020 07:36) (78 - 151)  BP: 90/56 (02 Jan 2020 07:36) (88/50 - 95/50)  BP(mean): --  RR: 17 (02 Jan 2020 07:36) (17 - 17)  SpO2: 94% (02 Jan 2020 07:36) (94% - 98%)    PHYSICAL EXAM  General: adult in NAD, chronically ill appeairng  HEENT: clear oropharynx, icteric sclera, pink conjunctivae  Neck: supple  CV: normal S1S2 with no murmur rubs or gallops  Lungs: clear to auscultation, no wheezes, no rhales  Abdomen: soft non-tender non-distended, no hepato/splenomegaly  Ext: no clubbing cyanosis or edema  Skin: no rashes and no petichiae  Neuro: alert and oriented X3 no focal deficits      LABS:  CBC Full  -  ( 02 Jan 2020 08:55 )  WBC Count : x  RBC Count : x  Hemoglobin : 7.2 g/dL  Hematocrit : 21.0 %  Platelet Count - Automated : x  Mean Cell Volume : x  Mean Cell Hemoglobin : x  Mean Cell Hemoglobin Concentration : x  Auto Neutrophil # : x  Auto Lymphocyte # : x  Auto Monocyte # : x  Auto Eosinophil # : x  Auto Basophil # : x  Auto Neutrophil % : x  Auto Lymphocyte % : x  Auto Monocyte % : x  Auto Eosinophil % : x  Auto Basophil % : x    01-02    141  |  107  |  118<H>  ----------------------------<  50<L>  3.9   |  20<L>  |  3.70<H>    Ca    8.3<L>      02 Jan 2020 06:13  Phos  6.0     01-02  Mg     2.9     01-02    TPro  5.6<L>  /  Alb  2.7<L>  /  TBili  4.0<H>  /  DBili  3.10<H>  /  AST  87<H>  /  ALT  70  /  AlkPhos  924<H>  01-02        fe studies  Ferritin, Serum: 1459 ng/mL (01-01 @ 13:17)  Iron - Total Binding Capacity.: Unable to calculate Test Repeated ug/dL (01-01 @ 13:16)      WBC trend  4.87 K/uL (01-02-20 @ 06:13)  4.29 K/uL (01-01-20 @ 06:52)  4.01 K/uL (12-31-19 @ 13:34)      Hgb trend  7.2 g/dL (01-02-20 @ 08:55)  7.0 g/dL (01-02-20 @ 06:13)  7.8 g/dL (01-01-20 @ 16:39)  7.3 g/dL (01-01-20 @ 06:52)  6.9 g/dL (12-31-19 @ 13:34)      plt trend  64 K/uL (01-02-20 @ 06:13)  64 K/uL (01-01-20 @ 06:52)  68 K/uL (12-31-19 @ 13:34)        RADIOLOGY & ADDITIONAL STUDIES:

## 2020-01-02 NOTE — PROGRESS NOTE ADULT - SUBJECTIVE AND OBJECTIVE BOX
Patient is a 69y old  Female who presents with a chief complaint of sob (02 Jan 2020 09:16)      Subjective: Patient seen and examined at bedside. No acute events overnight.     PAIN: n  DYSPNEA: n	  NAUS/VOM: n	  SECRETIONS: n	  AGITATION: n    OTHER REVIEW OF SYSTEMS: negative      T(C): 36.4 (01-02-20 @ 07:36), Max: 36.7 (01-01-20 @ 16:07)  HR: 151 (01-02-20 @ 07:36) (78 - 151)  BP: 90/56 (01-02-20 @ 07:36) (88/50 - 95/50)  RR: 17 (01-02-20 @ 07:36) (17 - 17)  SpO2: 94% (01-02-20 @ 07:36) (94% - 98%)  Wt(kg): --  GENERAL:  resting comfortably in NAD  HEENT:  NC/AT EOMI PERRL  NECK: supple no JVD  CVS:  +S1 S2 RRR  RESP: CTA B/L  GI:  soft NT/ND +BS  : no suprapubic tenderness  MUSC:  no lower extremity edema  NEURO:  AAOX3, no focal motor or sensory deficits   PSYCH:  Alert, Calm, Cooperative   SKIN:  Warm, moist, no rashes   LYMPH: normal     MEDS REVIEWED	          OPIATE NAÏVE (Y/N)    No Known Allergies      LABS REVIEWED:                        7.2    x     )-----------( x        ( 02 Jan 2020 08:55 )             21.0     01-02    141  |  107  |  118<H>  ----------------------------<  50<L>  3.9   |  20<L>  |  3.70<H>    Ca    8.3<L>      02 Jan 2020 06:13  Phos  6.0     01-02  Mg     2.9     01-02    TPro  5.6<L>  /  Alb  2.7<L>  /  TBili  4.0<H>  /  DBili  3.10<H>  /  AST  87<H>  /  ALT  70  /  AlkPhos  924<H>  01-02      IMAGING REVIEWED    ADVANCED DIRECTIVES:     Wants to be DNR/DNI and wants to complete [facility]    PSYCHOSOCIAL-SPIRITUAL ASSESSMENT:    _x__Reviewed     _x__Care  plan unchanged     ___Care plan adjusted as below    GOALS OF CARE DISCUSSION  	_x__Palliative care info/counseling provided	    ___Family meeting  	_x__Advanced Directives addressed	    ___See previous Palliative Medicine Note    AGENCY CHOICE DISCUSSED:   _x__HOSPICE   ___CALVARY  ___OTHER:              > 50% OF THE TIME SPENT IN COUNSELING AND COORDINATING CARE 	    Minutes:      PROLONGED SERVICE             FACE TO FACE:    PT            PT & FAMILY	       Minutes:      Advance Care Planning Time:

## 2020-01-02 NOTE — PROGRESS NOTE ADULT - SUBJECTIVE AND OBJECTIVE BOX
Patient is a 69y old  Female who presents with a chief complaint of sob (2020 07:59)       HPI:  70 yo F PMHx liver CA, kidney disease, HTN presents to ED c/o generalized weakness and increasing shortness of breath x a few weeks. Pt states that she was admitted last week at [facility] for this issue but states nothing was done to improve symptoms. Denies recent illness, URI symptoms, fever/chills, chest pain, abd pain.    In ER patient was found to be anemic and in TOM. Renal is being consulted for such. CKDIII baseline with Cr around 2. No other c/o.     States breathing mildly worse today.  No n/v.       PAST MEDICAL & SURGICAL HISTORY:  Liver cancer  Sarcoma: liver  Cancer of leg, right  Kidney stone  Hypertension  Renal calculi  Hypertension  S/P dilatation and curettage: for polyps  Cancer of leg, right: excision of tumor and txted with radiaton therapy  S/P cholecystectomy: appendectomy  History of nephrectomy  S/P arthroscopic surgery of left knee:   S/P tubal ligation  S/P appendectomy: cholecystectomy -   H/O partial nephrectomy: left -   S/P D&C: 2012  S/P  Section: 1968       FAMILY HISTORY:  Family history of breast cancer in sister (Sibling)  Family history of hypertension in mother  Family history of stroke: father  at 70 yr old  [place]    Social History:Non smoker    MEDICATIONS  (STANDING):  dronabinol 2.5 milliGRAM(s) Oral two times a day  furosemide    Tablet 20 milliGRAM(s) Oral three times a day  metolazone 2.5 milliGRAM(s) Oral daily  potassium chloride    Tablet ER 20 milliEquivalent(s) Oral every 2 hours    MEDICATIONS  (PRN):   Meds reviewed    Allergies    No Known Allergies    Intolerances           Vital Signs Last 24 Hrs  T(C): 36.4 (2020 07:36), Max: 36.7 (2020 16:07)  T(F): 97.5 (2020 07:36), Max: 98 (2020 16:07)  HR: 151 (2020 07:36) (78 - 151)  BP: 90/56 (2020 07:36) (88/50 - 95/50)  BP(mean): --  RR: 17 (2020 07:36) (17 - 17)  SpO2: 94% (2020 07:36) (94% - 98%)  PHYSICAL EXAM:    GENERAL: NAD  HEAD:  Atraumatic, Normocephalic  EYES: EOMI, PERRLA, conjunctiva and sclera clear  ENMT: No tonsillar erythema, exudates, or enlargement; Moist mucous membranes, Good dentition, No lesions  NECK: Supple, neck  veins full  NERVOUS SYSTEM:  Alert & Oriented X3, Good concentration; Motor Strength wnl upper and lower extremities  CHEST/LUNG: Clear to percussion bilaterally; No rales, rhonchi, wheezing, or rubs  HEART: Regular rate and rhythm; No murmurs, rubs, or gallops  ABDOMEN: Soft, Nontender, +mild distention   EXTREMITIES:  Edema ++  LYMPH: No lymphadenopathy noted  SKIN: No rashes or lesions, pale      LABS:                        7.2    x     )-----------( x        ( 2020 08:55 )             21.0         141  |  107  |  118<H>  ----------------------------<  50<L>  3.9   |  20<L>  |  3.70<H>    Ca    8.3<L>      2020 06:13  Phos  6.0       Mg     2.9         TPro  5.6<L>  /  Alb  2.7<L>  /  TBili  4.0<H>  /  DBili  3.10<H>  /  AST  87<H>  /  ALT  70  /  AlkPhos  924<H>      PT/INR - ( 31 Dec 2019 13:34 )   PT: 14.4 sec;   INR: 1.25 ratio         PTT - ( 31 Dec 2019 13:34 )  PTT:30.6 sec          PT/INR - ( 31 Dec 2019 13:34 )   PT: 14.4 sec;   INR: 1.25 ratio         PTT - ( 31 Dec 2019 13:34 )  PTT:30.6 sec    Magnesium, Serum: 3.0 mg/dL ( @ 06:52)  Phosphorus Level, Serum: 6.1 mg/dL ( @ 06:52)          RADIOLOGY & ADDITIONAL TESTS:

## 2020-01-02 NOTE — CONSULT NOTE ADULT - SUBJECTIVE AND OBJECTIVE BOX
CHIEF COMPLAINT: SOB    HISTORY OF PRESENT ILLNESS:    68 yo F PMHx liver CA, kidney disease, HTN presents to ED c/o generalized weakness and increasing shortness of breath x a few weeks. Pt states that she was admitted last week at [facility] for this issue but states nothing was done to improve symptoms. Denies recent illness, URI symptoms, fever/chills, chest pain, abd pain.    In ER patient was found to be anemic and in TOM.   consult for UR.800cc, cath placed TOM without Hydronephrosis, prior history left renal calculus s/p Perc. management  [place] contraption no dysuria, hematuria [id number] and says she had no voiding issues a week ago.    PAST MEDICAL & SURGICAL HISTORY:  Liver cancer  Sarcoma: liver  Cancer of leg, right  Kidney stone s/p left perc.   Hypertension  Renal calculi  Hypertension  S/P dilatation and curettage: for polyps  Cancer of leg, right: excision of tumor and treated  with radiation therapy  S/P cholecystectomy: appendectomy  History of nephrectomy  S/P arthroscopic surgery of left knee:   S/P tubal ligation  S/P appendectomy: cholecystectomy -   H/O partial nephrectomy: left -   S/P D&C: 2012  S/P  Section: 1968      REVIEW OF SYSTEMS:    CONSTITUTIONAL: No weakness, fevers or chills  EYES/ENT: No visual changes;  No vertigo or throat pain   NECK: No pain or stiffness  RESPIRATORY:  shortness of breath  CARDIOVASCULAR: No chest pain or palpitations  GASTROINTESTINAL: No abdominal or epigastric pain. No nausea, vomiting, or hematemesis; No diarrhea or constipation. No melena or hematochezia.,   GENITOURINARY: UR  NEUROLOGICAL: No numbness or weakness      MEDICATIONS  (STANDING):  albumin human 25% IVPB 50 milliLiter(s) IV Intermittent every 12 hours  dronabinol 5 milliGRAM(s) Oral two times a day  furosemide   Injectable 20 milliGRAM(s) IV Push every 6 hours  gabapentin 100 milliGRAM(s) Oral three times a day  midodrine. 5 milliGRAM(s) Oral three times a day  mirtazapine 7.5 milliGRAM(s) Oral at bedtime  senna 2 Tablet(s) Oral at bedtime  sodium bicarbonate 650 milliGRAM(s) Oral three times a day  tamsulosin 0.4 milliGRAM(s) Oral at bedtime    MEDICATIONS  (PRN):  bisacodyl 5 milliGRAM(s) Oral every 12 hours PRN Constipation  morphine  - Injectable 2 milliGRAM(s) IV Push every 6 hours PRN Severe Pain (7 - 10)  oxyCODONE    IR 5 milliGRAM(s) Oral four times a day PRN Moderate Pain (4 - 6)  traMADol 25 milliGRAM(s) Oral four times a day PRN Mild Pain (1 - 3)      Allergies    No Known Allergies    Intolerances        SOCIAL HISTORY:    FAMILY HISTORY:  Family history of breast cancer in sister (Sibling)  Family history of hypertension in mother  Family history of stroke: father  at 70 yr old      Vital Signs Last 24 Hrs  T(C): 36.4 (2020 07:36), Max: 36.7 (2020 16:07)  T(F): 97.5 (2020 07:36), Max: 98 (2020 16:07)  HR: 151 (2020 07:36) (78 - 151)  BP: 90/56 (2020 07:36) (88/50 - 95/50)  BP(mean): --  RR: 17 (2020 07:36) (17 - 17)  SpO2: 94% (2020 07:36) (94% - 98%)    PHYSICAL EXAM:    Constitutional: NAD, well-developed  HEENT: LEATHA, EOMI, Normal Hearing, MMM  Neck: No LAD, No JVD  Back: Normal spine flexure, No CVA tenderness  Respiratory: rales at bases   Cardiovascular: S1 and S2, RRR, no M/G/R  Abd: soft but 'doughy', BS+, NT/ND, No CVAT, well healed right sub coatal and mid line supra=pubic incisions  : cath with grossly clear urine  : not examined  Extremities:  edema 2 + pitting edema  Vascular: 2+ peripheral pulses  Neurological: A/O x 3, no focal deficits  Psychiatric: Normal mood, normal affect      LABS:                        7.0    4.87  )-----------( 64       ( 2020 06:13 )             20.6     01-02    141  |  107  |  118<H>  ----------------------------<  50<L>  3.9   |  20<L>  |  3.70<H>    Ca    8.3<L>      2020 06:13  Phos  6.0       Mg     2.9         TPro  5.6<L>  /  Alb  2.7<L>  /  TBili  4.0<H>  /  DBili  3.10<H>  /  AST  87<H>  /  ALT  70  /  AlkPhos  924<H>      PT/INR - ( 31 Dec 2019 13:34 )   PT: 14.4 sec;   INR: 1.25 ratio         PTT - ( 31 Dec 2019 13:34 )  PTT:30.6 sec    Urine Culture: Culture - Urine (16 @ 17:24)    Culture - Urine:   NO GROWTH AT 24 HOURS    Specimen Source: URINE KIDNEY      Hemoglobin: 7.0 g/dL ( @ 06:13)  Hematocrit: 20.6 % ( @ 06:13)  Hemoglobin: 7.8 g/dL ( @ 16:39)  Hematocrit: 22.7 % ( @ 16:39)  Hemoglobin: 7.3 g/dL ( @ 06:52)  Hematocrit: 21.7 % ( @ 06:52)  Hemoglobin: 6.9 g/dL ( @ 13:34)  Hematocrit: 21.0 % ( @ 13:34)      RADIOLOGY & ADDITIONAL STUDIES:  < from: CT Abdomen and Pelvis No Cont (19 @ 18:02) >  EXAM:  CT ABDOMEN AND PELVIS                            PROCEDURE DATE:  2019          INTERPRETATION:  CLINICAL INFORMATION: Abdominal distention     COMPARISON: 2019    PROCEDURE:   CT of the Abdomen and Pelvis was performed without intravenous contrast.   Intravenous contrast: None.  Oral contrast: None.  Sagittal and coronal reformats were performed.    FINDINGS:    LOWER CHEST: Pleural-based mass is seen in the lateral aspect of the lingula inferiorly from the prior study measuring approximately 1.6 cm suspicious for metastatic focus. There is a small right pleural effusion with passive atelectasis at the right lung base new from the prior study as well    LIVER: Cyst is again seen in the left lobe. Additionally there are multiple low-attenuation masses throughout the liver causing enlargement of the liver and nodularity of the liver contour consistent with metastatic disease. A lesion in the right hepatic lobe has back, lower in attenuation suggestive of necrosis with linear high attenuation areas which may represent calcification. Please correlate for any intervening treatment. The liver is enlarged causing inferior displacement of the right kidney.  BILE DUCTS: Normal caliber.  GALLBLADDER: Surgically absent  SPLEEN: Within normal limits.  PANCREAS: Within normal limits.  ADRENALS: Within normal limits.  KIDNEYS/URETERS: Within normal limits.    BLADDER: Within normal limits.  REPRODUCTIVE ORGANS: Uterus and adnexa within normal limits.    BOWEL: No bowel obstruction. Appendix is not visualized. No evidence of inflammation in the pericecal region.. Diverticulosis  PERITONEUM: Small amount of pelvic ascites VESSELS: Within normal limits.  RETROPERITONEUM/LYMPH NODES: No lymphadenopathy.    ABDOMINAL WALL: Within normal limits.  BONES: Degenerative changes. These involve the spine and the right hip    There is diffuse anasarca.    IMPRESSION:   Small amount of ascites  Anasarca  Diffuse liver metastases again noted  New pleural-based mass left lung likely metastatic in origin  New small right pleural effusion                BLAZE ZENG M.D.,ATTENDING RADIOLOGIST  This document has been electronically signed. Dec 31 2019  7:51PM        < end of copied text >  < from: CT Abdomen and Pelvis No Cont (12.31.19 @ 18:02) >  EXAM:  CT ABDOMEN AND PELVIS

## 2020-01-02 NOTE — PROVIDER CONTACT NOTE (CRITICAL VALUE NOTIFICATION) - ACTION/TREATMENT ORDERED:
awaiting orders.
per Dr. Coates await orders from day team for possible transfusion
Blood transfusion ordered.

## 2020-01-02 NOTE — CONSULT NOTE ADULT - ASSESSMENT
UR without obvious cause, start Flomax, continue Payne    TOM without hydronephrosis    Hepatic mets, anemia - Oncology work up/cpnsult on chart    Anemia s/p transplant

## 2020-01-02 NOTE — PROGRESS NOTE ADULT - ASSESSMENT
1.1.20 69 F with metastatic sarcoma comes with SOB, anasarca, TOM, anemia and transaminitis. Spoke to patient and family regarding goals of care. Patient and family are agreeable for home hospice. She is also willing to complete MOLST and sign DNR/DNI. Continue diuresis and transfuse prn. Will follow.     1.2.20 Awaiting hospice eval and palliative care RN to complete MOLST. Transfuse prn. Continue diuresis.

## 2020-01-02 NOTE — CONSULT NOTE ADULT - SUBJECTIVE AND OBJECTIVE BOX
[facility] Cardiology    CHIEF COMPLAINT: Patient is a 69y old  Female who presents with a chief complaint of sob (2020 12:10)      HPI:  70 yo F PMHx liver CA, kidney disease, HTN presents to ED c/o generalized weakness and increasing shortness of breath x a few weeks. Pt states that she was admitted last week at [facility] for this issue but states nothing was done to improve symptoms. Denies recent illness, URI symptoms, fever/chills, chest pain, abd pain.  In ER patient was found to be anemic and in TOM.  patient is being admitted for further work up and treatment. (31 Dec 2019 17:14)      PAST MEDICAL & SURGICAL HISTORY:  Liver cancer  Sarcoma: liver  Cancer of leg, right  Kidney stone  Hypertension  Renal calculi  Hypertension  S/P dilatation and curettage: for polyps  Cancer of leg, right: excision of tumor and txted with radiaton therapy  S/P cholecystectomy: appendectomy  History of nephrectomy  S/P arthroscopic surgery of left knee:   S/P tubal ligation  S/P appendectomy: cholecystectomy -   H/O partial nephrectomy: left -   S/P D&C: 2012  S/P  Section: 1968    SOCIAL HISTORY: no tobacco    FAMILY HISTORY:  Family history of breast cancer in sister (Sibling)  Family history of hypertension in mother  Family history of stroke: father  at 70 yr old      MEDICATIONS  (STANDING):  albumin human 25% IVPB 50 milliLiter(s) IV Intermittent every 12 hours  digoxin     Tablet 0.125 milliGRAM(s) Oral daily  dronabinol 5 milliGRAM(s) Oral two times a day  furosemide   Injectable 20 milliGRAM(s) IV Push every 6 hours  gabapentin 100 milliGRAM(s) Oral three times a day  midodrine. 5 milliGRAM(s) Oral three times a day  mirtazapine 7.5 milliGRAM(s) Oral at bedtime  senna 2 Tablet(s) Oral at bedtime  sodium bicarbonate 650 milliGRAM(s) Oral three times a day  tamsulosin 0.4 milliGRAM(s) Oral at bedtime    MEDICATIONS  (PRN):  bisacodyl 5 milliGRAM(s) Oral every 12 hours PRN Constipation  morphine  - Injectable 2 milliGRAM(s) IV Push every 6 hours PRN Severe Pain (7 - 10)  oxyCODONE    IR 5 milliGRAM(s) Oral four times a day PRN Moderate Pain (4 - 6)  traMADol 25 milliGRAM(s) Oral four times a day PRN Mild Pain (1 - 3)      Allergies    No Known Allergies    Intolerances      REVIEW OF SYSTEMS:  CONSTITUTIONAL: weakness, no fevers   EYES/ENT: No visual changes  NECK: No pain or stiffness  RESPIRATORY: No shortness of breath  CARDIOVASCULAR: No chest pain or palpitations  GASTROINTESTINAL: No abdominal pain  GENITOURINARY: No hematuria  NEUROLOGICAL:  weakness  SKIN: No rash  All other review of systems is negative unless indicated above    VITAL SIGNS:   Vital Signs Last 24 Hrs  T(C): 36.4 (2020 07:36), Max: 36.7 (2020 16:07)  T(F): 97.5 (2020 07:36), Max: 98 (2020 16:07)  HR: 151 (2020 07:36) (78 - 151)  BP: 90/56 (2020 07:36) (88/50 - 95/50)  BP(mean): --  RR: 17 (2020 07:36) (17 - 17)  SpO2: 94% (2020 07:36) (94% - 98%)  I&O's Summary    2020 07:01  -  2020 07:00  --------------------------------------------------------  IN: 775 mL / OUT: 1700 mL / NET: -925 mL      PHYSICAL EXAM:  Constitutional: NAD  Neurological: Alert and oriented  HEENT: EOMI, no JVD  Cardiovascular: S1 and S2  Pulmonary: breath sounds bilaterally, no wheeze  Gastrointestinal: Bowel Sounds present, soft, nontender  Ext: peripheral edema mild  Skin: ecchymosis, No cyanosis.  Psych:  Mood & affect appropriate   LABS: All Labs Reviewed:                        7.2    x     )-----------( x        ( 2020 08:55 )             21.0     -    141  |  107  |  118<H>  ----------------------------<  50<L>  3.9   |  20<L>  |  3.70<H>    Ca    8.3<L>      2020 06:13  Phos  6.0     -  Mg     2.9     -    TPro  5.6<L>  /  Alb  2.7<L>  /  TBili  4.0<H>  /  DBili  3.10<H>  /  AST  87<H>  /  ALT  70  /  AlkPhos  924<H>      PT/INR - ( 31 Dec 2019 13:34 )   PT: 14.4 sec;   INR: 1.25 ratio         PTT - ( 31 Dec 2019 13:34 )  PTT:30.6 sec      70 yo F PMHx liver CA, kidney disease, HTN presents to ED c/o generalized weakness and increasing shortness of breath x a few weeks. Pt states that she was admitted last week at [facility] for this issue but states nothing was done to improve symptoms. Denies recent illness, URI symptoms, fever/chills, chest pain, abd pain.  In ER patient was found to be anemic and in TOM.  patient is being admitted for further work up and treatment.   Blood Culture:       RADIOLOGY/EKG:  Diagnosis Line Normal sinus rhythm  Cannot rule out Anterior infarct , age undetermined  Nonspecific T wave abnormality      5 beat NSVT in setting of significant anemia and medical issues described above  PLAN    Given SBP 90, not good candidate for BB now  2D ECHO-EF 65%, mod MR, observe on remote tele  NO CP or SOB

## 2020-01-03 ENCOUNTER — TRANSCRIPTION ENCOUNTER (OUTPATIENT)
Age: 70
End: 2020-01-03

## 2020-01-03 VITALS
DIASTOLIC BLOOD PRESSURE: 64 MMHG | HEART RATE: 87 BPM | RESPIRATION RATE: 17 BRPM | SYSTOLIC BLOOD PRESSURE: 101 MMHG | TEMPERATURE: 98 F | OXYGEN SATURATION: 96 %

## 2020-01-03 LAB
ANION GAP SERPL CALC-SCNC: 16 MMOL/L — SIGNIFICANT CHANGE UP (ref 5–17)
BUN SERPL-MCNC: 117 MG/DL — HIGH (ref 7–23)
CALCIUM SERPL-MCNC: 8.3 MG/DL — LOW (ref 8.5–10.1)
CHLORIDE SERPL-SCNC: 105 MMOL/L — SIGNIFICANT CHANGE UP (ref 96–108)
CO2 SERPL-SCNC: 17 MMOL/L — LOW (ref 22–31)
CREAT SERPL-MCNC: 3.9 MG/DL — HIGH (ref 0.5–1.3)
DIGOXIN SERPL-MCNC: 0.6 NG/ML — LOW (ref 0.8–2)
GLUCOSE SERPL-MCNC: 128 MG/DL — HIGH (ref 70–99)
HCT VFR BLD CALC: 22.5 % — LOW (ref 34.5–45)
HGB BLD-MCNC: 7.5 G/DL — LOW (ref 11.5–15.5)
MCHC RBC-ENTMCNC: 33.3 GM/DL — SIGNIFICANT CHANGE UP (ref 32–36)
MCHC RBC-ENTMCNC: 34.2 PG — HIGH (ref 27–34)
MCV RBC AUTO: 102.7 FL — HIGH (ref 80–100)
NRBC # BLD: 0 /100 WBCS — SIGNIFICANT CHANGE UP (ref 0–0)
PLATELET # BLD AUTO: 59 K/UL — LOW (ref 150–400)
POTASSIUM SERPL-MCNC: 3.6 MMOL/L — SIGNIFICANT CHANGE UP (ref 3.5–5.3)
POTASSIUM SERPL-SCNC: 3.6 MMOL/L — SIGNIFICANT CHANGE UP (ref 3.5–5.3)
RBC # BLD: 2.19 M/UL — LOW (ref 3.8–5.2)
RBC # FLD: 24.1 % — HIGH (ref 10.3–14.5)
SODIUM SERPL-SCNC: 138 MMOL/L — SIGNIFICANT CHANGE UP (ref 135–145)
WBC # BLD: 5.16 K/UL — SIGNIFICANT CHANGE UP (ref 3.8–10.5)
WBC # FLD AUTO: 5.16 K/UL — SIGNIFICANT CHANGE UP (ref 3.8–10.5)

## 2020-01-03 PROCEDURE — 85610 PROTHROMBIN TIME: CPT

## 2020-01-03 PROCEDURE — 71045 X-RAY EXAM CHEST 1 VIEW: CPT

## 2020-01-03 PROCEDURE — 84550 ASSAY OF BLOOD/URIC ACID: CPT

## 2020-01-03 PROCEDURE — 71045 X-RAY EXAM CHEST 1 VIEW: CPT | Mod: 26

## 2020-01-03 PROCEDURE — 84156 ASSAY OF PROTEIN URINE: CPT

## 2020-01-03 PROCEDURE — 86880 COOMBS TEST DIRECT: CPT

## 2020-01-03 PROCEDURE — 86870 RBC ANTIBODY IDENTIFICATION: CPT

## 2020-01-03 PROCEDURE — 86900 BLOOD TYPING SEROLOGIC ABO: CPT

## 2020-01-03 PROCEDURE — 82570 ASSAY OF URINE CREATININE: CPT

## 2020-01-03 PROCEDURE — 80162 ASSAY OF DIGOXIN TOTAL: CPT

## 2020-01-03 PROCEDURE — 36415 COLL VENOUS BLD VENIPUNCTURE: CPT

## 2020-01-03 PROCEDURE — A9567: CPT

## 2020-01-03 PROCEDURE — 82140 ASSAY OF AMMONIA: CPT

## 2020-01-03 PROCEDURE — 86850 RBC ANTIBODY SCREEN: CPT

## 2020-01-03 PROCEDURE — 84133 ASSAY OF URINE POTASSIUM: CPT

## 2020-01-03 PROCEDURE — 83550 IRON BINDING TEST: CPT

## 2020-01-03 PROCEDURE — 76705 ECHO EXAM OF ABDOMEN: CPT

## 2020-01-03 PROCEDURE — 97162 PT EVAL MOD COMPLEX 30 MIN: CPT

## 2020-01-03 PROCEDURE — 82728 ASSAY OF FERRITIN: CPT

## 2020-01-03 PROCEDURE — 83540 ASSAY OF IRON: CPT

## 2020-01-03 PROCEDURE — 83880 ASSAY OF NATRIURETIC PEPTIDE: CPT

## 2020-01-03 PROCEDURE — 76700 US EXAM ABDOM COMPLETE: CPT

## 2020-01-03 PROCEDURE — 85027 COMPLETE CBC AUTOMATED: CPT

## 2020-01-03 PROCEDURE — 74176 CT ABD & PELVIS W/O CONTRAST: CPT

## 2020-01-03 PROCEDURE — 93970 EXTREMITY STUDY: CPT

## 2020-01-03 PROCEDURE — 93306 TTE W/DOPPLER COMPLETE: CPT

## 2020-01-03 PROCEDURE — 83935 ASSAY OF URINE OSMOLALITY: CPT

## 2020-01-03 PROCEDURE — 85014 HEMATOCRIT: CPT

## 2020-01-03 PROCEDURE — 82248 BILIRUBIN DIRECT: CPT

## 2020-01-03 PROCEDURE — 82270 OCCULT BLOOD FECES: CPT

## 2020-01-03 PROCEDURE — P9047: CPT

## 2020-01-03 PROCEDURE — 85018 HEMOGLOBIN: CPT

## 2020-01-03 PROCEDURE — 84540 ASSAY OF URINE/UREA-N: CPT

## 2020-01-03 PROCEDURE — A9540: CPT

## 2020-01-03 PROCEDURE — 78582 LUNG VENTILAT&PERFUS IMAGING: CPT | Mod: 26

## 2020-01-03 PROCEDURE — 99285 EMERGENCY DEPT VISIT HI MDM: CPT | Mod: 25

## 2020-01-03 PROCEDURE — 86922 COMPATIBILITY TEST ANTIGLOB: CPT

## 2020-01-03 PROCEDURE — P9016: CPT

## 2020-01-03 PROCEDURE — 83735 ASSAY OF MAGNESIUM: CPT

## 2020-01-03 PROCEDURE — 85730 THROMBOPLASTIN TIME PARTIAL: CPT

## 2020-01-03 PROCEDURE — 84300 ASSAY OF URINE SODIUM: CPT

## 2020-01-03 PROCEDURE — 78582 LUNG VENTILAT&PERFUS IMAGING: CPT

## 2020-01-03 PROCEDURE — 86901 BLOOD TYPING SEROLOGIC RH(D): CPT

## 2020-01-03 PROCEDURE — 80053 COMPREHEN METABOLIC PANEL: CPT

## 2020-01-03 PROCEDURE — 93005 ELECTROCARDIOGRAM TRACING: CPT

## 2020-01-03 PROCEDURE — 80048 BASIC METABOLIC PNL TOTAL CA: CPT

## 2020-01-03 PROCEDURE — 84100 ASSAY OF PHOSPHORUS: CPT

## 2020-01-03 PROCEDURE — 87205 SMEAR GRAM STAIN: CPT

## 2020-01-03 PROCEDURE — 36430 TRANSFUSION BLD/BLD COMPNT: CPT

## 2020-01-03 RX ORDER — MIDODRINE HYDROCHLORIDE 2.5 MG/1
1 TABLET ORAL
Qty: 0 | Refills: 0 | DISCHARGE
Start: 2020-01-03

## 2020-01-03 RX ORDER — FUROSEMIDE 40 MG
1 TABLET ORAL
Qty: 0 | Refills: 0 | DISCHARGE

## 2020-01-03 RX ORDER — MIRTAZAPINE 45 MG/1
1 TABLET, ORALLY DISINTEGRATING ORAL
Qty: 30 | Refills: 0
Start: 2020-01-03 | End: 2020-02-01

## 2020-01-03 RX ORDER — FUROSEMIDE 40 MG
1 TABLET ORAL
Qty: 90 | Refills: 0
Start: 2020-01-03 | End: 2020-02-01

## 2020-01-03 RX ORDER — DIGOXIN 250 MCG
1 TABLET ORAL
Qty: 0 | Refills: 0 | DISCHARGE
Start: 2020-01-03

## 2020-01-03 RX ORDER — SENNA PLUS 8.6 MG/1
2 TABLET ORAL
Qty: 0 | Refills: 0 | DISCHARGE
Start: 2020-01-03

## 2020-01-03 RX ORDER — METOPROLOL TARTRATE 50 MG
1 TABLET ORAL
Qty: 0 | Refills: 0 | DISCHARGE
Start: 2020-01-03

## 2020-01-03 RX ORDER — MIRTAZAPINE 45 MG/1
1 TABLET, ORALLY DISINTEGRATING ORAL
Qty: 0 | Refills: 0 | DISCHARGE
Start: 2020-01-03

## 2020-01-03 RX ORDER — DIGOXIN 250 MCG
1 TABLET ORAL
Qty: 30 | Refills: 0
Start: 2020-01-03 | End: 2020-02-01

## 2020-01-03 RX ORDER — SODIUM BICARBONATE 1 MEQ/ML
1 SYRINGE (ML) INTRAVENOUS
Qty: 0 | Refills: 0 | DISCHARGE
Start: 2020-01-03

## 2020-01-03 RX ORDER — ACETAMINOPHEN 500 MG
2 TABLET ORAL
Qty: 0 | Refills: 0 | DISCHARGE

## 2020-01-03 RX ORDER — METOPROLOL TARTRATE 50 MG
1 TABLET ORAL
Qty: 30 | Refills: 0
Start: 2020-01-03 | End: 2020-02-01

## 2020-01-03 RX ORDER — SODIUM BICARBONATE 1 MEQ/ML
650 SYRINGE (ML) INTRAVENOUS
Refills: 0 | Status: DISCONTINUED | OUTPATIENT
Start: 2020-01-03 | End: 2020-01-03

## 2020-01-03 RX ORDER — GABAPENTIN 400 MG/1
1 CAPSULE ORAL
Qty: 90 | Refills: 0
Start: 2020-01-03 | End: 2020-02-01

## 2020-01-03 RX ORDER — MIDODRINE HYDROCHLORIDE 2.5 MG/1
1 TABLET ORAL
Qty: 90 | Refills: 0
Start: 2020-01-03 | End: 2020-02-01

## 2020-01-03 RX ORDER — METOPROLOL TARTRATE 50 MG
25 TABLET ORAL DAILY
Refills: 0 | Status: DISCONTINUED | OUTPATIENT
Start: 2020-01-03 | End: 2020-01-03

## 2020-01-03 RX ORDER — SODIUM BICARBONATE 1 MEQ/ML
1 SYRINGE (ML) INTRAVENOUS
Qty: 90 | Refills: 0
Start: 2020-01-03 | End: 2020-02-01

## 2020-01-03 RX ORDER — GABAPENTIN 400 MG/1
1 CAPSULE ORAL
Qty: 0 | Refills: 0 | DISCHARGE
Start: 2020-01-03

## 2020-01-03 RX ORDER — FUROSEMIDE 40 MG
2 TABLET ORAL
Qty: 0 | Refills: 0 | DISCHARGE

## 2020-01-03 RX ORDER — DRONABINOL 2.5 MG
1 CAPSULE ORAL
Qty: 0 | Refills: 0 | DISCHARGE

## 2020-01-03 RX ADMIN — Medication 20 MILLIGRAM(S): at 11:30

## 2020-01-03 RX ADMIN — MIDODRINE HYDROCHLORIDE 5 MILLIGRAM(S): 2.5 TABLET ORAL at 06:57

## 2020-01-03 RX ADMIN — Medication 650 MILLIGRAM(S): at 13:17

## 2020-01-03 RX ADMIN — Medication 20 MILLIGRAM(S): at 05:50

## 2020-01-03 RX ADMIN — Medication 5 MILLIGRAM(S): at 05:48

## 2020-01-03 RX ADMIN — Medication 0.12 MILLIGRAM(S): at 06:57

## 2020-01-03 RX ADMIN — Medication 50 MILLILITER(S): at 05:48

## 2020-01-03 RX ADMIN — GABAPENTIN 100 MILLIGRAM(S): 400 CAPSULE ORAL at 13:17

## 2020-01-03 RX ADMIN — Medication 650 MILLIGRAM(S): at 05:51

## 2020-01-03 RX ADMIN — GABAPENTIN 100 MILLIGRAM(S): 400 CAPSULE ORAL at 05:48

## 2020-01-03 RX ADMIN — MIDODRINE HYDROCHLORIDE 5 MILLIGRAM(S): 2.5 TABLET ORAL at 11:30

## 2020-01-03 RX ADMIN — Medication 20 MILLIGRAM(S): at 00:30

## 2020-01-03 NOTE — PROGRESS NOTE ADULT - SUBJECTIVE AND OBJECTIVE BOX
[facility] Cardiology    CHIEF COMPLAINT: Patient is a 69y old  Female who presents with a chief complaint of sob (2020 09:04)      Follow Up: [ ] Chest Pain      [ ] Dyspnea     [ ] Palpitations    [ ] Atrial Fibrillation     [ ] Ventricular Dysrhythmia    [ ] Abnormal EKG                      [ ] Abnormal Cardiac Enzymes     [ ] Valvular Disease    HPI:  68 yo F PMHx liver CA, kidney disease, HTN presents to ED c/o generalized weakness and increasing shortness of breath x a few weeks. Pt states that she was admitted last week at [facility] for this issue but states nothing was done to improve symptoms. Denies recent illness, URI symptoms, fever/chills, chest pain, abd pain.  In ER patient was found to be anemic and in TOM.  patient is being admitted for further work up and treatment. (31 Dec 2019 17:14)    no cp or sob    PAST MEDICAL & SURGICAL HISTORY:  Liver cancer  Sarcoma: liver  Cancer of leg, right  Kidney stone  Hypertension  Renal calculi  Hypertension  S/P dilatation and curettage: for polyps  Cancer of leg, right: excision of tumor and txted with radiaton therapy  S/P cholecystectomy: appendectomy  History of nephrectomy  S/P arthroscopic surgery of left knee:   S/P tubal ligation  S/P appendectomy: cholecystectomy -   H/O partial nephrectomy: left -   S/P D&C: 2012  S/P  Section: 1968      MEDICATIONS  (STANDING):  digoxin     Tablet 0.125 milliGRAM(s) Oral daily  dronabinol 5 milliGRAM(s) Oral two times a day  furosemide   Injectable 20 milliGRAM(s) IV Push every 6 hours  gabapentin 100 milliGRAM(s) Oral three times a day  midodrine. 5 milliGRAM(s) Oral three times a day  mirtazapine 7.5 milliGRAM(s) Oral at bedtime  senna 2 Tablet(s) Oral at bedtime  sodium bicarbonate 650 milliGRAM(s) Oral three times a day  tamsulosin 0.4 milliGRAM(s) Oral at bedtime    MEDICATIONS  (PRN):  bisacodyl 5 milliGRAM(s) Oral every 12 hours PRN Constipation  HYDROmorphone  Injectable 0.5 milliGRAM(s) IV Push four times a day PRN breakthrough pain  morphine  - Injectable 2 milliGRAM(s) IV Push every 6 hours PRN Severe Pain (7 - 10)  oxyCODONE    IR 5 milliGRAM(s) Oral four times a day PRN Moderate Pain (4 - 6)  traMADol 25 milliGRAM(s) Oral four times a day PRN Mild Pain (1 - 3)      Allergies    No Known Allergies    Intolerances        REVIEW OF SYSTEMS:    CONSTITUTIONAL: No weakness, fevers or chills.   EYES/ENT: No visual changes;  No vertigo or throat pain   NECK: No pain or stiffness  RESPIRATORY: No cough, wheezing, hemoptysis; No shortness of breath  CARDIOVASCULAR: No chest pain or palpitations  GASTROINTESTINAL: No abdominal or epigastric pain. No nausea, vomiting, or hematemesis; No diarrhea or constipation. No melena or hematochezia.  GENITOURINARY: No dysuria, frequency or hematuria  NEUROLOGICAL: No numbness or weakness  SKIN: No itching, burning, rashes, or lesions   All other review of systems is negative unless indicated above    Vital Signs Last 24 Hrs  T(C): 36.4 (2020 07:18), Max: 36.5 (2020 15:25)  T(F): 97.5 (2020 07:18), Max: 97.7 (2020 15:25)  HR: 89 (2020 07:18) (78 - 137)  BP: 100/58 (2020 07:18) (90/50 - 118/62)  BP(mean): --  RR: 17 (2020 07:18) (16 - 18)  SpO2: 94% (2020 07:18) (93% - 98%)    I&O's Summary    2020 07:01  -  2020 07:00  --------------------------------------------------------  IN: 1006 mL / OUT: 425 mL / NET: 581 mL        PHYSICAL EXAM:    Constitutional: NAD  Neurological: Alert and oriented  HEENT: EOMI, no JVD  Cardiovascular: S1 and S2  Pulmonary: breath sounds bilaterally, no wheeze  Gastrointestinal: Bowel Sounds present, soft, nontender  Ext: peripheral edema mild  Skin: ecchymosis, No cyanosis.  Psych:  Mood & affect appropriate   LABS: All Labs Reviewed:                        7.5    5.16  )-----------( 59       ( 2020 09:34 )             22.5     01-03    138  |  105  |  117<H>  ----------------------------<  128<H>  3.6   |  17<L>  |  3.90<H>    Ca    8.3<L>      2020 09:34  Phos  6.0     -  Mg     2.9         TPro  5.6<L>  /  Alb  2.7<L>  /  TBili  4.0<H>  /  DBili  3.10<H>  /  AST  87<H>  /  ALT  70  /  AlkPhos  924<H>          68 yo F PMHx liver CA, kidney disease, HTN presents to ED c/o generalized weakness and increasing shortness of breath x a few weeks. Pt states that she was admitted last week at [facility] for this issue but states nothing was done to improve symptoms. Denies recent illness, URI symptoms, fever/chills, chest pain, abd pain.  In ER patient was found to be anemic and in TOM.  patient is being admitted for further work up and treatment.     RADIOLOGY/EKG:  Diagnosis Line Normal sinus rhythm  Cannot rule out Anterior infarct , age undetermined  Nonspecific T wave abnormality      5 beat NSVT in setting of significant anemia and medical issues described above on   No overnight events    PLAN  NO CP or SOB      2D ECHO-EF 65%, mod MR, observe on remote tele  Elevated LVEP by tissue doppler index on echo  REC Cont IV lasix, 20 q 6  Check BNP  Will add low dose BB with hold parameters

## 2020-01-03 NOTE — DISCHARGE NOTE PROVIDER - NSDCMRMEDTOKEN_GEN_ALL_CORE_FT
bisacodyl 5 mg oral delayed release tablet: 1 tab(s) orally every 12 hours, As needed, Constipation  digoxin 125 mcg (0.125 mg) oral tablet: 1 tab(s) orally once a day  furosemide 20 mg oral tablet: 1 tab(s) orally 3 times a day  gabapentin 100 mg oral capsule: 1 cap(s) orally 3 times a day  metOLazone 2.5 mg oral tablet: 1 tab(s) orally 2 times a day  metoprolol succinate 25 mg oral tablet, extended release: 1 tab(s) orally once a day  midodrine 5 mg oral tablet: 1 tab(s) orally 3 times a day  mirtazapine 7.5 mg oral tablet: 1 tab(s) orally once a day (at bedtime)  senna oral tablet: 2 tab(s) orally once a day (at bedtime)  sodium bicarbonate 650 mg oral tablet: 1 tab(s) orally 3 times a day bisacodyl 5 mg oral delayed release tablet: 1 tab(s) orally every 12 hours, As needed, Constipation  digoxin 125 mcg (0.125 mg) oral tablet: 1 tab(s) orally once a day  furosemide 20 mg oral tablet: 1 tab(s) orally 3 times a day  gabapentin 100 mg oral capsule: 1 cap(s) orally 3 times a day  metOLazone 2.5 mg oral tablet: 1 tab(s) orally 2 times a day  metoprolol succinate 25 mg oral tablet, extended release: 1 tab(s) orally once a day  midodrine 5 mg oral tablet: 1 tab(s) orally 3 times a day  mirtazapine 7.5 mg oral tablet: 1 tab(s) orally once a day (at bedtime)  senna oral tablet: 2 tab(s) orally once a day (at bedtime)  sodium bicarbonate 650 mg oral tablet: 1 tab(s) orally 3 times a day  Tylenol 325 mg oral capsule: 2 tab(s) orally every 6 hours, As Needed

## 2020-01-03 NOTE — DISCHARGE NOTE PROVIDER - CARE PROVIDER_API CALL
Amico, Frank J (DO)  Internal Medicine  [address], [address]  [place] [address]  Phone: [phone]  Fax: [phone]  Follow Up Time:     Ledy Hernández)  Medical Oncology  [address], [address]  [place] [address]  Phone: [phone]  Fax: [phone]  Follow Up Time: Amico, Frank J (DO)  Internal Medicine  [address], [address]  [place] [address]  Phone: [phone]  Fax: [phone]  Follow Up Time:     Ledy Hernández)  Medical Oncology  [address], [address]  [place] [address]  Phone: [phone]  Fax: [phone]  Follow Up Time:     md SHIVA henry / [facility]  Phone: (   )    -  Fax: (   )    -  Follow Up Time:

## 2020-01-03 NOTE — ADVANCED PRACTICE NURSE CONSULT - ASSESSMENT
Patient is a 70yo female to be discharged to home hospice presents with an ulceration above the left lateral ankle 4 x 3 x 1 mild to moderate serosanguinous exudate, periwound intact, no odor, no warmth, no erythema noted pain to the touch using faces pain scale 4/10 of 10/10 scale.

## 2020-01-03 NOTE — DISCHARGE NOTE PROVIDER - HOSPITAL COURSE
admitted for TOM and leg swelling    no DVT or PE    diuresis with lasix    rapid AF / SVT - digoxin and beta blocker per cardio    not a good candidate for AC    poor prognosis for hepatic sarcoma    DC after cardio and oncology clearance

## 2020-01-03 NOTE — DISCHARGE NOTE PROVIDER - PROVIDER TOKENS
PROVIDER:[TOKEN:[[id number]]],PROVIDER:[TOKEN:[[id number]]] PROVIDER:[TOKEN:[[id number]]],PROVIDER:[TOKEN:[[id number]]],FREE:[LAST:[hospice],FIRST:[md],PHONE:[(   )    -],FAX:[(   )    -],ADDRESS:[[facility] / [facility]]]

## 2020-01-03 NOTE — DISCHARGE NOTE PROVIDER - NSDCCPCAREPLAN_GEN_ALL_CORE_FT
PRINCIPAL DISCHARGE DIAGNOSIS  Diagnosis: Shortness of breath  Assessment and Plan of Treatment: follow up with hospice MD      SECONDARY DISCHARGE DIAGNOSES  Diagnosis: Abdominal distention  Assessment and Plan of Treatment:

## 2020-01-03 NOTE — PROGRESS NOTE ADULT - ATTENDING COMMENTS
"This medical record contains text that has been entered with the assistance of computer voice recognition and dictation software.  Therefore, it may contain unintended errors in text, spelling, punctuation, or grammar        Chief Complaint   Patient presents with   • Tingling     in arms   •         •         •    •             St. Kelsea Rey is a 17 y.o. 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 84 Tab 3   • benzonatate (TESSALON) 100 MG Cap Take 1 Cap by mouth 3 times a day as needed for Cough. 60 Cap 0   • doxycycline (VIBRAMYCIN) 100 MG Tab Take 1 Tab by mouth 2 times a day. 28 Tab 0   • fluocinonide (LIDEX) 0.05 % gel Apply 1 Application to affected area(s) 2 Times a Day. Scalp 45 g 1   • ciclopirox (LOPROX) 0.77 % cream Daily to the affected area on the body 1 Tube 2   • albuterol (PROAIR HFA) 108 (90 Base) MCG/ACT Aero Soln inhalation aerosol Inhale 2 Puffs by mouth every 6 hours as needed for Shortness of Breath. 8.5 g 0   • fluticasone (FLONASE) 50 MCG/ACT nasal spray Spray 1 Spray in nose 1 time daily as needed.       No current facility-administered medications for this visit.      Patient Active Problem List    Diagnosis Date Noted   • Neuropathy (HCC) 10/18/2018   • Vision changes 10/18/2018   • Neoplasm of uncertain behavior 01/08/2018   • "
"Exercise-induced asthma 01/08/2018   • Panic attacks 01/08/2018   • Stress due to illness of family member 01/08/2018   • Anxiety 11/13/2017   • Severe episode of recurrent major depressive disorder, without psychotic features (HCC) 11/13/2017   • Anorexia nervosa with bulimia 11/13/2017   • Attention deficit hyperactivity disorder (ADHD), combined type 10/09/2017   • Environmental allergies 10/09/2017     Past Surgical History:   Procedure Laterality Date   • DILATION AND CURETTAGE  12/13/2017    Procedure: DILATION AND CURETTAGE- FOR SKENE'S CYST REMOVAL VS MARSUPIALIZATION;  Surgeon: Luz Jeronimo M.D.;  Location: SURGERY [facility];  Service: Gynecology      Social History   Substance Use Topics   • Smoking status: Current Every Day Smoker     Packs/day: 0.25     Years: 3.00     Types: Cigarettes   • Smokeless tobacco: Never Used   • Alcohol use No      Comment: 1 per month     Family History   Problem Relation Age of Onset   • Cancer Mother         Thyroid CA   • Hypertension Father            ROS    See HPI  + disordered eating and binge  Neg for chest pain, neg for headache, neg for syncope  All other systems reviewed and are negative     Objective:     Blood pressure 108/64, pulse 93, height 1.803 m (5' 11\"), weight 51.9 kg (114 lb 6.4 oz), SpO2 98 %. Body mass index is 15.96 kg/m².  Physical Exam:    GEN: comfortable, alert and oriented, well nourished, well developed, in no apparent distress   HEENT: NCAT, eyes: pupils equal and reactive, sclera white, EOMIT, good dentition  HEART: limbs warm and well perfused, regular rate, no JVD, no lower extremity edema  LUNGS: speaking in full sentences, not in apparent respiratory distress, no audible wheezes  MSK: normal tone and bulk, no swelling of the joints, gait steady and normal   No midline ttp neck   + paraspinal muscle tenderness  + sperlings R side  R/u/m motor in tact             Assessment and Plan:   The following treatment plan was "
discussed        Problem List Items Addressed This Visit     Neuropathy (HCC)     Patient having numbness in her R upper exremity associated with neck strain   Most likely dx cervical radiculopathy given + ashtyn   No weakness identified on exam   Will do labs as well   Follow up 2 weeks to review results  Start PT   X rays ordered as well      Over 25 minutes spent with patient face to face, greater than 50% time spent with plan/coordination of care regarding that which is discussed in the HPI and A&P           Relevant Orders    VITAMIN B12    FOLATE    TSH WITH REFLEX TO FT4    DX-CERVICAL SPINE-2 OR 3 VIEWS    IRON/TOTAL IRON BIND    HEMOGLOBIN A1C    BASIC METABOLIC PANEL    VITAMIN B1    REFERRAL TO PHYSICAL THERAPY Reason for Therapy: Eval/Treat/Report    Vision changes     Gradual reading vision issues  I recommend ref to optometry for vision testing              Relevant Orders    REFERRAL TO OPTOMETRY      Other Visit Diagnoses     Vaccine counseling        Relevant Orders    Influenza Vaccine Quad Injection >3Y (PF) (Completed)    Cervical radiculopathy        Relevant Orders    REFERRAL TO PHYSICAL THERAPY Reason for Therapy: Eval/Treat/Report          Over 25 minutes spent with patient face to face, greater than 50% time spent with plan/cordination of care         Instructed to follow up if symptoms worsen or fail to improve, ER/UC precautions discussed as well    Kathe Dunaway MD  [facility], Family Medicine   [address] [place] [address] [address]ky   [place] [address]  Phone: [phone]             
Advanced care planning was discussed with patient and family.  Advanced care planning forms were reviewed and discussed.  Risks, benefits and alternatives of gastroenterologic procedures were discussed in detail and all questions were answered.    30 minutes spent.
patient is hospice / comfort care appropriate

## 2020-01-03 NOTE — DISCHARGE NOTE PROVIDER - NSDCFUSCHEDAPPT_GEN_ALL_CORE_FT
JEEVAN LOONEY ; 02/05/2020 ; \[facility]\"" Urology [address] [facility]  JEEVAN LOONEY ; 02/05/2020 ; \[facility]\"" Urology [address] [place] [address] JEEVAN LOONEY ; 02/05/2020 ; [facility] U[facility] [address] [facility]  JEEVAN LOONEY ; 02/05/2020 ; [facility] Urology [address] [place] [address]

## 2020-01-03 NOTE — PROGRESS NOTE ADULT - SUBJECTIVE AND OBJECTIVE BOX
Patient is a 69y old  Female who presents with a chief complaint of sob (2020 07:59)       HPI:  68 yo F PMHx liver CA, kidney disease, HTN presents to ED c/o generalized weakness and increasing shortness of breath x a few weeks. Pt states that she was admitted last week at [facility] for this issue but states nothing was done to improve symptoms. Denies recent illness, URI symptoms, fever/chills, chest pain, abd pain.    In ER patient was found to be anemic and in OTM. Renal is being consulted for such. CKDIII baseline with Cr around 2. No other c/o.     States breathing mildly worse today.  No n/v.       PAST MEDICAL & SURGICAL HISTORY:  Liver cancer  Sarcoma: liver  Cancer of leg, right  Kidney stone  Hypertension  Renal calculi  Hypertension  S/P dilatation and curettage: for polyps  Cancer of leg, right: excision of tumor and txted with radiaton therapy  S/P cholecystectomy: appendectomy  History of nephrectomy  S/P arthroscopic surgery of left knee:   S/P tubal ligation  S/P appendectomy: cholecystectomy -   H/O partial nephrectomy: left -   S/P D&C: 2012  S/P  Section: 1968       FAMILY HISTORY:  Family history of breast cancer in sister (Sibling)  Family history of hypertension in mother  Family history of stroke: father  at 70 yr old  [place]    Social History:Non smoker    MEDICATIONS  (STANDING):  dronabinol 2.5 milliGRAM(s) Oral two times a day  furosemide    Tablet 20 milliGRAM(s) Oral three times a day  metolazone 2.5 milliGRAM(s) Oral daily  potassium chloride    Tablet ER 20 milliEquivalent(s) Oral every 2 hours    MEDICATIONS  (PRN):   Meds reviewed    Allergies    No Known Allergies    Intolerances           Vital Signs Last 24 Hrs  Vital Signs Last 24 Hrs  T(C): 36.4 (2020 07:18), Max: 36.5 (2020 15:25)  T(F): 97.5 (2020 07:18), Max: 97.7 (2020 15:25)  HR: 90 (2020 11:29) (78 - 137)  BP: 103/62 (2020 11:29) (90/50 - 118/62)  BP(mean): --  RR: 16 (2020 11:29) (16 - 18)  SpO2: 91% (2020 11:29) (91% - 98%)    GENERAL: NAD  HEAD:  Atraumatic, Normocephalic  EYES: EOMI, conjunctiva and sclera clear  ENMT: No tonsillar erythema, exudates, or enlargement; Moist mucous membranes, Good dentition, No lesions  NECK: Supple, neck  veins full  NERVOUS SYSTEM:  Awake and alert  CHEST/LUNG: Clear to percussion bilaterally; No rales, rhonchi, wheezing, or rubs  HEART: Regular rate and rhythm; No murmurs, rubs, or gallops  ABDOMEN: Soft, Nontender, +mild distention   EXTREMITIES:  Edema ++  LYMPH: No lymphadenopathy noted  SKIN: No rashes or lesions, pale      LABS:                        7.5    5.16  )-----------( 59       ( 2020 09:34 )             22.5     01-03    138  |  105  |  117<H>  ----------------------------<  128<H>  3.6   |  17<L>  |  3.90<H>    Ca    8.3<L>      2020 09:34  Phos  6.0     -  Mg     2.9     -    TPro  5.6<L>  /  Alb  2.7<L>  /  TBili  4.0<H>  /  DBili  3.10<H>  /  AST  87<H>  /  ALT  70  /  AlkPhos  924<H>                    PT/INR - ( 31 Dec 2019 13:34 )   PT: 14.4 sec;   INR: 1.25 ratio         PTT - ( 31 Dec 2019 13:34 )  PTT:30.6 sec    Magnesium, Serum: 3.0 mg/dL ( @ 06:52)  Phosphorus Level, Serum: 6.1 mg/dL ( @ 06:52)          RADIOLOGY & ADDITIONAL TESTS: Patient is a 69y old  Female who presents with a chief complaint of sob (2020 07:59)       HPI:  68 yo F PMHx liver CA, kidney disease, HTN presents to ED c/o generalized weakness and increasing shortness of breath x a few weeks. Pt states that she was admitted last week at [facility] for this issue but states nothing was done to improve symptoms. Denies recent illness, URI symptoms, fever/chills, chest pain, abd pain.    In ER patient was found to be anemic and in TOM. Renal is being consulted for such. CKDIII baseline with Cr around 2. No other c/o.     Feels a little better today.  No n/v.       PAST MEDICAL & SURGICAL HISTORY:  Liver cancer  Sarcoma: liver  Cancer of leg, right  Kidney stone  Hypertension  Renal calculi  Hypertension  S/P dilatation and curettage: for polyps  Cancer of leg, right: excision of tumor and txted with radiaton therapy  S/P cholecystectomy: appendectomy  History of nephrectomy  S/P arthroscopic surgery of left knee:   S/P tubal ligation  S/P appendectomy: cholecystectomy -   H/O partial nephrectomy: left -   S/P D&C: 2012  S/P  Section: 1968       FAMILY HISTORY:  Family history of breast cancer in sister (Sibling)  Family history of hypertension in mother  Family history of stroke: father  at 70 yr old  [place]    Social History:Non smoker    MEDICATIONS  (STANDING):  dronabinol 2.5 milliGRAM(s) Oral two times a day  furosemide    Tablet 20 milliGRAM(s) Oral three times a day  metolazone 2.5 milliGRAM(s) Oral daily  potassium chloride    Tablet ER 20 milliEquivalent(s) Oral every 2 hours    MEDICATIONS  (PRN):   Meds reviewed    Allergies    No Known Allergies    Intolerances           Vital Signs Last 24 Hrs  Vital Signs Last 24 Hrs  T(C): 36.4 (2020 07:18), Max: 36.5 (2020 15:25)  T(F): 97.5 (2020 07:18), Max: 97.7 (2020 15:25)  HR: 90 (2020 11:29) (78 - 137)  BP: 103/62 (2020 11:29) (90/50 - 118/62)  BP(mean): --  RR: 16 (2020 11:29) (16 - 18)  SpO2: 91% (2020 11:29) (91% - 98%)    GENERAL: NAD  HEAD:  Atraumatic, Normocephalic  EYES: EOMI, conjunctiva and sclera clear  ENMT: No tonsillar erythema, exudates, or enlargement; Moist mucous membranes, Good dentition, No lesions  NECK: Supple, neck  veins full  NERVOUS SYSTEM:  Awake and alert  CHEST/LUNG: Clear to percussion bilaterally; No rales, rhonchi, wheezing, or rubs  HEART: Regular rate and rhythm; No murmurs, rubs, or gallops  ABDOMEN: Soft, Nontender, +mild distention   EXTREMITIES:  Edema ++  LYMPH: No lymphadenopathy noted  SKIN: No rashes or lesions, pale      LABS:                        7.5    5.16  )-----------( 59       ( 2020 09:34 )             22.5     01-03    138  |  105  |  117<H>  ----------------------------<  128<H>  3.6   |  17<L>  |  3.90<H>    Ca    8.3<L>      2020 09:34  Phos  6.0     -  Mg     2.9     -    TPro  5.6<L>  /  Alb  2.7<L>  /  TBili  4.0<H>  /  DBili  3.10<H>  /  AST  87<H>  /  ALT  70  /  AlkPhos  924<H>                    PT/INR - ( 31 Dec 2019 13:34 )   PT: 14.4 sec;   INR: 1.25 ratio         PTT - ( 31 Dec 2019 13:34 )  PTT:30.6 sec    Magnesium, Serum: 3.0 mg/dL ( @ 06:52)  Phosphorus Level, Serum: 6.1 mg/dL ( @ 06:52)          RADIOLOGY & ADDITIONAL TESTS:

## 2020-01-03 NOTE — PHYSICAL THERAPY INITIAL EVALUATION ADULT - ADDITIONAL COMMENTS
pt lives in house w/ 3 steps/rail to enter.  As per family, pt is assisted w/ all functional mobility and ambulation.

## 2020-01-03 NOTE — PROGRESS NOTE ADULT - ASSESSMENT
TOM/CKDIII  Metabolic acidosis  Hypokalemia  Anemia  Metastatic disease   SOB    - TOM on CKDIII. CKDIII baseline with Cr around 2  - TOM with possible HRS. HRS is a diagnosed of exclusion. Urine studies. IV albumin  - Patient started on midodrine and receiving IV albumin  - continue low dose  Lasix.   - Urology consulted, gunter placed for retention, possible TOV  - Oral bicarb   - Anemia per Heme/Onc and GI  - Heme/onc note reviewed, not candidate for additional therapies.  - Hospice eval  - No acute indication for dialysis, but given her co morbidities and oncologic prognosis, she would be a poor candidate for dialysis.     d/w Dr. ospina/trina  Thank you for involving nephrology with the care of this patient TOM/CKDIII  Metabolic acidosis  Hypokalemia  Anemia  Metastatic disease   SOB    - TOM on CKDIII. CKDIII baseline with Cr around 2  - TOM with possible HRS. HRS is a diagnosed of exclusion. Urine studies. IV albumin  - Patient started on midodrine and receiving IV albumin  - continue low dose  Lasix.   - Urology consulted, gunter placed for retention, possible TOV  - Acidosis worsening, increase Oral bicarb   - Anemia per Heme/Onc and GI  - Heme/onc note reviewed, not candidate for additional therapies.  - Hospice eval  - No acute indication for dialysis, but given her co morbidities and oncologic prognosis, she would be a poor candidate for dialysis.     d/w primary  d/w Dr. ospina/trina  Thank you for involving nephrology with the care of this patient

## 2020-01-03 NOTE — ADVANCED PRACTICE NURSE CONSULT - RECOMMEDATIONS
1. Cleanse with NS and apply aquacel every 2-3 days less drainage change q3days more drainage change dressing Q2days  Rn educated in the care of this patients wound care   LM for MD regarding recommendations

## 2020-01-03 NOTE — PROGRESS NOTE ADULT - SUBJECTIVE AND OBJECTIVE BOX
Progress Note:    UR with TOM improving looks and feels better, no problems with Payne    PAST MEDICAL & SURGICAL HISTORY:  Liver cancer  Sarcoma: liver  Cancer of leg, right  Kidney stone  Hypertension  Renal calculi  Hypertension  S/P dilatation and curettage: for polyps  Cancer of leg, right: excision of tumor and txted with radiaton therapy  S/P cholecystectomy: appendectomy  History of nephrectomy  S/P arthroscopic surgery of left knee:   S/P tubal ligation  S/P appendectomy: cholecystectomy -   H/O partial nephrectomy: left -   S/P D&C: 2012  S/P  Section: 1968      MEDICATIONS  (STANDING):  digoxin     Tablet 0.125 milliGRAM(s) Oral daily  dronabinol 5 milliGRAM(s) Oral two times a day  furosemide   Injectable 20 milliGRAM(s) IV Push every 6 hours  gabapentin 100 milliGRAM(s) Oral three times a day  metoprolol succinate ER 25 milliGRAM(s) Oral daily  midodrine. 5 milliGRAM(s) Oral three times a day  mirtazapine 7.5 milliGRAM(s) Oral at bedtime  senna 2 Tablet(s) Oral at bedtime  sodium bicarbonate 650 milliGRAM(s) Oral three times a day  tamsulosin 0.4 milliGRAM(s) Oral at bedtime    MEDICATIONS  (PRN):  bisacodyl 5 milliGRAM(s) Oral every 12 hours PRN Constipation  HYDROmorphone  Injectable 0.5 milliGRAM(s) IV Push four times a day PRN breakthrough pain  morphine  - Injectable 2 milliGRAM(s) IV Push every 6 hours PRN Severe Pain (7 - 10)  oxyCODONE    IR 5 milliGRAM(s) Oral four times a day PRN Moderate Pain (4 - 6)  traMADol 25 milliGRAM(s) Oral four times a day PRN Mild Pain (1 - 3)      Allergies    No Known Allergies    Intolerances            FAMILY HISTORY:  Family history of breast cancer in sister (Sibling)  Family history of hypertension in mother  Family history of stroke: father  at 70 yr old      Vital Signs Last 24 Hrs  T(C): 36.4 (2020 07:18), Max: 36.5 (2020 15:25)  T(F): 97.5 (2020 07:18), Max: 97.7 (2020 15:25)  HR: 89 (2020 07:18) (78 - 137)  BP: 100/58 (2020 07:18) (90/50 - 118/62)  BP(mean): --  RR: 17 (2020 07:18) (16 - 18)  SpO2: 94% (2020 07:18) (93% - 98%)    PHYSICAL EXAM:    Constitutional: NAD, well-developed  Abd: BS+, soft, NT/ND, No CVAT  : Payne with grossly clear urine   Extremities: No peripheral edema      LABS:                        7.5    5.16  )-----------( 59       ( 2020 09:34 )             22.5         138  |  105  |  117<H>  ----------------------------<  128<H>  3.6   |  17<L>  |  3.90<H>    Ca    8.3<L>      2020 09:34  Phos  6.0       Mg     2.9         TPro  5.6<L>  /  Alb  2.7<L>  /  TBili  4.0<H>  /  DBili  3.10<H>  /  AST  87<H>  /  ALT  70  /  AlkPhos  924<H>          Urine Culture:   Hemoglobin: 7.5 g/dL ( @ 09:34)  Hematocrit: 22.5 % ( @ 09:34)  Hemoglobin: 7.2 g/dL ( @ 08:55)  Hematocrit: 21.0 % ( @ 08:55)  Hemoglobin: 7.0 g/dL ( @ 06:13)  Hematocrit: 20.6 % ( @ 06:13)  Hemoglobin: 7.8 g/dL ( @ 16:39)  Hematocrit: 22.7 % ( @ 16:39)      RADIOLOGY & ADDITIONAL STUDIES:

## 2020-01-03 NOTE — DISCHARGE NOTE NURSING/CASE MANAGEMENT/SOCIAL WORK - PATIENT PORTAL LINK FT
You can access the FollowMyHealth Patient Portal offered by [facility] by registering at the following website: [email or web address]. By joining [facility]’s FollowMyHealth portal, you will also be able to view your health information using other applications (apps) compatible with our system.

## 2020-01-03 NOTE — CHART NOTE - NSCHARTNOTEFT_GEN_A_CORE
Do you have Advance Directives (HCP / LV / Organ donation / Documentation of oral advance Directive):   (  x  )  yes    (      )    NO                                                                            Do you have LV - Living will :                                                                                                                                             (    )  yes    (  x    )   No    Do you have HCP - Health Care Proxy:                                                                                                                            (   x  )  yes   (       ) N0    Do you have DNR- Do Not Resuscitate :                                                                                                                           (   x   )  yes  (        )  No    Do you have DNI- Do Not intubate  :                                                                                                                               (   x   )  yes   (       ) No    Do you have MOLST - Medical orders for Life sustaining treatment  :                                                                    (   x   ) yes    (       ) No    Decision Maker :  (  x   ) Patient     (  x    )  HCA   (     ) Public Health Law Surrogate     (      ) Surrogate  (       ) Guardian    Goals of Care :  (      )   Complete Care     (       ) No Limitations                              (   x    )   Comfort Care       (   x    )  Hospice                               (   x   )   Limited medical Intervention / s    Medical Interventions :   (        )   CPR       (     x   )  DNR                                               (        )  Intubation with MV - Mechanical Ventilation  (      ) BIPAP/CPAP    (    x     )   DNI                                               (         )  Artificial Nutrition -  IVF, TPN / PPN, Tube Feeds             (    x     )   No Feeding Tube                                                (    x    ) Use Antibiotics                         (          ) No Antibiotics                                                (    x     ) Blood and Blood Products     (         )   No Blood or Blood products                                                (          )  Dialysis                                    (     x    )  No Dialysis                                                (          )  Medical Management only  (     x    )  No Invasive Interventions or Surgery  Time spent :                        (       ) up to 30 minutes                       (   x        )   more than 30 minutes  GOC of care reviewed and discussed with patient and multiple family members on multiple occasions

## 2020-01-03 NOTE — PROGRESS NOTE ADULT - ASSESSMENT
elevated lfts  anemia  fobt+   metastatic hemagiopericytoma    imaging reviewed- diffuse liver mets  rapid rise in lfts, suspect 2/2 progression of disease  trend lfts, avoid hepatotoxins  anemic with no overt gi bleed  sp prbc, f/u am labs  monitor cbc, transfuse prn  cont bowel regimen  diet as tolerated; cont appetite stimulants   heme/onc eval noted, not a candidate for further tx  pall care following, pt dnr/dni, requesting home hospice evaluation  prognosis poor

## 2020-01-03 NOTE — PROGRESS NOTE ADULT - ASSESSMENT
1.1.20 69 F with metastatic sarcoma comes with SOB, anasarca, TOM, anemia and transaminitis. Spoke to patient and family regarding goals of care. Patient and family are agreeable for home hospice. She is also willing to complete MOLST and sign DNR/DNI. Continue diuresis and transfuse prn. Will follow.     1.2.20 Awaiting hospice eval and palliative care RN to complete MOLST. Transfuse prn. Continue diuresis.    1.3.20 Accepted to home hospice. Going home today.

## 2020-01-03 NOTE — PROGRESS NOTE ADULT - SUBJECTIVE AND OBJECTIVE BOX
Patient is a 69y old  Female who presents with a chief complaint of sob (02 Jan 2020 09:16)      Subjective: Patient seen and examined at bedside. No acute events overnight.     PAIN: n  DYSPNEA: n	  NAUS/VOM: n	  SECRETIONS: n	  AGITATION: n    OTHER REVIEW OF SYSTEMS: negative    Vital Signs Last 24 Hrs  T(C): 36.4 (03 Jan 2020 07:18), Max: 36.5 (02 Jan 2020 15:25)  T(F): 97.5 (03 Jan 2020 07:18), Max: 97.7 (02 Jan 2020 15:25)  HR: 90 (03 Jan 2020 11:29) (86 - 137)  BP: 103/62 (03 Jan 2020 11:29) (96/59 - 118/62)  BP(mean): --  RR: 16 (03 Jan 2020 11:29) (16 - 18)  SpO2: 91% (03 Jan 2020 11:29) (91% - 98%)    01-03    138  |  105  |  117<H>  ----------------------------<  128<H>  3.6   |  17<L>  |  3.90<H>    Ca    8.3<L>      03 Jan 2020 09:34  Phos  6.0     01-02  Mg     2.9     01-02    TPro  5.6<L>  /  Alb  2.7<L>  /  TBili  4.0<H>  /  DBili  3.10<H>  /  AST  87<H>  /  ALT  70  /  AlkPhos  924<H>  01-02                          7.5    5.16  )-----------( 59       ( 03 Jan 2020 09:34 )             22.5       CAPILLARY BLOOD GLUCOSE                  bisacodyl 5 milliGRAM(s) Oral every 12 hours PRN  digoxin     Tablet 0.125 milliGRAM(s) Oral daily  dronabinol 5 milliGRAM(s) Oral two times a day  furosemide   Injectable 20 milliGRAM(s) IV Push every 6 hours  gabapentin 100 milliGRAM(s) Oral three times a day  HYDROmorphone  Injectable 0.5 milliGRAM(s) IV Push four times a day PRN  metoprolol succinate ER 25 milliGRAM(s) Oral daily  midodrine. 5 milliGRAM(s) Oral three times a day  mirtazapine 7.5 milliGRAM(s) Oral at bedtime  morphine  - Injectable 2 milliGRAM(s) IV Push every 6 hours PRN  oxyCODONE    IR 5 milliGRAM(s) Oral four times a day PRN  senna 2 Tablet(s) Oral at bedtime  sodium bicarbonate 650 milliGRAM(s) Oral four times a day  tamsulosin 0.4 milliGRAM(s) Oral at bedtime  traMADol 25 milliGRAM(s) Oral four times a day PRN        GENERAL:  resting comfortably in NAD  HEENT:  NC/AT EOMI PERRL  NECK: supple no JVD  CVS:  +S1 S2 RRR  RESP: CTA B/L  GI:  soft NT/ND +BS  : no suprapubic tenderness  MUSC:  no lower extremity edema  NEURO:  AAOX3, no focal motor or sensory deficits   PSYCH:  Alert, Calm, Cooperative   SKIN:  Warm, moist, no rashes   LYMPH: normal     MEDS REVIEWED	          OPIATE NAÏVE (Y/N)    No Known Allergies      LABS REVIEWED:                        7.2    x     )-----------( x        ( 02 Jan 2020 08:55 )             21.0     01-02    141  |  107  |  118<H>  ----------------------------<  50<L>  3.9   |  20<L>  |  3.70<H>    Ca    8.3<L>      02 Jan 2020 06:13  Phos  6.0     01-02  Mg     2.9     01-02    TPro  5.6<L>  /  Alb  2.7<L>  /  TBili  4.0<H>  /  DBili  3.10<H>  /  AST  87<H>  /  ALT  70  /  AlkPhos  924<H>  01-02      IMAGING REVIEWED    ADVANCED DIRECTIVES:     Wants to be DNR/DNI and wants to complete MOLST    PSYCHOSOCIAL-SPIRITUAL ASSESSMENT:    _x__Reviewed     _x__Care  plan unchanged     ___Care plan adjusted as below    GOALS OF CARE DISCUSSION  	_x__Palliative care info/counseling provided	    ___Family meeting  	_x__Advanced Directives addressed	    ___See previous Palliative Medicine Note    AGENCY CHOICE DISCUSSED:   _x__HOSPICE   ___CALVARY  ___OTHER:              > 50% OF THE TIME SPENT IN COUNSELING AND COORDINATING CARE 	    Minutes:      PROLONGED SERVICE             FACE TO FACE:    PT            PT & FAMILY	       Minutes:      Advance Care Planning Time:

## 2020-01-03 NOTE — PROGRESS NOTE ADULT - SUBJECTIVE AND OBJECTIVE BOX
INTERVAL HPI/OVERNIGHT EVENTS:  pt seen and examined  interim events noted, tachy overnight  denies n/v/d/c/abd pain  no s/s active gib per nursing  sp 1u prbc yesterday  labs pending  vq scan pending    MEDICATIONS  (STANDING):  digoxin     Tablet 0.125 milliGRAM(s) Oral daily  dronabinol 5 milliGRAM(s) Oral two times a day  furosemide   Injectable 20 milliGRAM(s) IV Push every 6 hours  gabapentin 100 milliGRAM(s) Oral three times a day  midodrine. 5 milliGRAM(s) Oral three times a day  mirtazapine 7.5 milliGRAM(s) Oral at bedtime  senna 2 Tablet(s) Oral at bedtime  sodium bicarbonate 650 milliGRAM(s) Oral three times a day  tamsulosin 0.4 milliGRAM(s) Oral at bedtime    MEDICATIONS  (PRN):  bisacodyl 5 milliGRAM(s) Oral every 12 hours PRN Constipation  HYDROmorphone  Injectable 0.5 milliGRAM(s) IV Push four times a day PRN breakthrough pain  morphine  - Injectable 2 milliGRAM(s) IV Push every 6 hours PRN Severe Pain (7 - 10)  oxyCODONE    IR 5 milliGRAM(s) Oral four times a day PRN Moderate Pain (4 - 6)  traMADol 25 milliGRAM(s) Oral four times a day PRN Mild Pain (1 - 3)      Allergies    No Known Allergies    Intolerances        Review of Systems:    General:  No wt loss, fevers, chills, night sweats, fatigue   Eyes:  Good vision, no reported pain  ENT:  No sore throat, pain, runny nose, dysphagia  CV:  No pain, palpitations, hypo/hypertension  Resp:  No dyspnea, cough, tachypnea, wheezing  GI:  No pain, No nausea, No vomiting, No diarrhea, No constipation, No weight loss, No fever, No pruritis, No rectal bleeding, No melena, No dysphagia  :  No pain, bleeding, incontinence, nocturia  Muscle:  No pain, weakness  Neuro:  No weakness, tingling, memory problems  Psych:  No fatigue, insomnia, mood problems, depression  Endocrine:  No polyuria, polydypsia, cold/heat intolerance  Heme:  No petechiae, ecchymosis, easy bruisability  Skin:  No rash, tattoos, scars, edema      Vital Signs Last 24 Hrs  T(C): 36.4 (03 Jan 2020 07:18), Max: 36.5 (02 Jan 2020 15:25)  T(F): 97.5 (03 Jan 2020 07:18), Max: 97.7 (02 Jan 2020 15:25)  HR: 89 (03 Jan 2020 07:18) (78 - 137)  BP: 100/58 (03 Jan 2020 07:18) (90/50 - 118/62)  BP(mean): --  RR: 17 (03 Jan 2020 07:18) (16 - 18)  SpO2: 94% (03 Jan 2020 07:18) (93% - 98%)    PHYSICAL EXAM:    Constitutional: lying in bed  HEENT: ncat  Gastrointestinal: firm dt +scar to abd  Extremities: erythema/edema le; generalized edema  Neurological: A/O x 3        LABS:                        7.2    x     )-----------( x        ( 02 Jan 2020 08:55 )             21.0     01-02    141  |  107  |  118<H>  ----------------------------<  50<L>  3.9   |  20<L>  |  3.70<H>    Ca    8.3<L>      02 Jan 2020 06:13  Phos  6.0     01-02  Mg     2.9     01-02    TPro  5.6<L>  /  Alb  2.7<L>  /  TBili  4.0<H>  /  DBili  3.10<H>  /  AST  87<H>  /  ALT  70  /  AlkPhos  924<H>  01-02          RADIOLOGY & ADDITIONAL TESTS:

## 2020-02-03 PROBLEM — C22.9 MALIGNANT NEOPLASM OF LIVER, NOT SPECIFIED AS PRIMARY OR SECONDARY: Chronic | Status: ACTIVE | Noted: 2019-12-31

## 2020-02-05 ENCOUNTER — APPOINTMENT (OUTPATIENT)
Dept: UROLOGY | Facility: CLINIC | Age: 70
End: 2020-02-05

## 2021-12-07 NOTE — ED ADULT NURSE NOTE - NS ED NURSE DISCH DISPOSITION
Last Appointment:  10/6/2021  Future Appointments   Date Time Provider Enmanuel Santamaria   1/11/2022  3:00 PM MD Marcela Vidal [facility] Admitted

## 2022-01-21 NOTE — ED PROVIDER NOTE - TOBACCO USE
Problem: At Risk for Falls  Goal: # Patient does not fall  Outcome: Outcome Met, Continue evaluating goal progress toward completion  Goal: # Takes action to control fall-related risks  Outcome: Outcome Met, Continue evaluating goal progress toward completion  Goal: # Verbalizes understanding of fall risk/precautions  Description: Document education using the patient education activity  Outcome: Outcome Not Met, Continue to Monitor     Problem: At Risk for Injury Due to Fall  Goal: # Patient does not fall  Outcome: Outcome Met, Continue evaluating goal progress toward completion  Goal: # Takes action to control condition specific risks  Outcome: Outcome Met, Continue evaluating goal progress toward completion  Goal: # Verbalizes understanding of fall-related injury personal risks  Description: Document education using the patient education activity  Outcome: Outcome Not Met, Continue to Monitor      Former smoker

## 2022-07-15 NOTE — ED ADULT NURSE NOTE - BREATHING
Received a message from the call center that pt called c/o nausea and vomiting. RN called Mr Clover Vitale with interpretor and pt stated he has been experiencing nausea and vomiting since morning. Pt stated feeling weak and is unable to eat or drink due to nausea. Pt also stated that he is unable to take antiemetics due to side effects. RN informed pt that he is at risk for dehydration and instructed pt to go to the ED for evaluation. Pt agreeable with RN recommendation.
dyspnea upon exertion

## 2023-10-10 NOTE — ED PROVIDER NOTE - CPE EDP RESP NORM
Pt called to discuss breast augmentation further. She stated her insurance requires a letter of medical necessity for top surgery. Writer discuss LOS. Pt asked how much breast augmentation typically is with insurance, and writer gave her the cost of care line to ask for a ballpark amount. Pt to call back if she wishes to schedule.    normal...

## 2024-03-26 NOTE — PROGRESS NOTE ADULT - PROBLEM SELECTOR PLAN 5
IV fluid hydration with normal saline  Hold chlorthalidone  Check TSH  Monitor BMP and assess response     Oncologist erika

## 2024-12-23 NOTE — DIETITIAN INITIAL EVALUATION ADULT. - WEIGHT CHANGE
A catheter was exchanged for a (CATHETER 6FR 3DRC CRV 100CM LG INNER LUM RADOPQ SLCT INFNT) catheter. yes

## 2025-01-16 ENCOUNTER — APPOINTMENT (OUTPATIENT)
Dept: NUCLEAR MEDICINE | Facility: HOSPITAL | Age: 75
End: 2025-01-16